# Patient Record
Sex: FEMALE | Race: WHITE | NOT HISPANIC OR LATINO | Employment: FULL TIME | URBAN - METROPOLITAN AREA
[De-identification: names, ages, dates, MRNs, and addresses within clinical notes are randomized per-mention and may not be internally consistent; named-entity substitution may affect disease eponyms.]

---

## 2018-07-05 ENCOUNTER — TELEPHONE (OUTPATIENT)
Dept: FAMILY MEDICINE | Facility: CLINIC | Age: 58
End: 2018-07-05

## 2018-07-05 NOTE — TELEPHONE ENCOUNTER
Pt out of town and unable to come in.  Wood sliver in R index finger tip x 2 weeks.  Red,swollen,tender with drainage.  Pt will soak in warm soapy water and massage to get it out.  Will f/u sooner where she is, if finger becomes swollen,red,fever.  Kpavlern

## 2018-07-05 NOTE — TELEPHONE ENCOUNTER
Reason for call:  Patient reporting a symptom    Symptom or request: Pt got a sliver in her finger on her left index finger a week ago and it's puffy and red.  Pt states that she is going out of town and cannot come into the clinic, due to going out of town and she wants to speak to an RN to see what she can do @ home.      Duration (how long have symptoms been present): 1 week    Have you been treated for this before? No    Additional comments:     Phone Number patient can be reached at:  Home number on file [phone] (home)    Best Time:  any    Can we leave a detailed message on this number:  YES    Call taken on 7/5/2018 at 8:36 AM by Ashlee Gross

## 2018-07-08 ENCOUNTER — HEALTH MAINTENANCE LETTER (OUTPATIENT)
Age: 58
End: 2018-07-08

## 2019-06-04 ENCOUNTER — ANESTHESIA EVENT (OUTPATIENT)
Dept: SURGERY | Facility: CLINIC | Age: 59
End: 2019-06-04
Payer: COMMERCIAL

## 2019-06-04 ENCOUNTER — APPOINTMENT (OUTPATIENT)
Dept: CT IMAGING | Facility: CLINIC | Age: 59
End: 2019-06-04
Attending: EMERGENCY MEDICINE
Payer: COMMERCIAL

## 2019-06-04 ENCOUNTER — HOSPITAL ENCOUNTER (OUTPATIENT)
Facility: CLINIC | Age: 59
Discharge: HOME | End: 2019-06-04
Attending: EMERGENCY MEDICINE | Admitting: SURGERY
Payer: COMMERCIAL

## 2019-06-04 ENCOUNTER — ANESTHESIA (OUTPATIENT)
Dept: SURGERY | Facility: CLINIC | Age: 59
End: 2019-06-04
Payer: COMMERCIAL

## 2019-06-04 VITALS
TEMPERATURE: 98.2 F | BODY MASS INDEX: 25.43 KG/M2 | OXYGEN SATURATION: 95 % | HEART RATE: 55 BPM | SYSTOLIC BLOOD PRESSURE: 104 MMHG | HEIGHT: 67 IN | WEIGHT: 162 LBS | RESPIRATION RATE: 16 BRPM | DIASTOLIC BLOOD PRESSURE: 66 MMHG

## 2019-06-04 DIAGNOSIS — K35.209 ACUTE APPENDICITIS WITH GENERALIZED PERITONITIS, WITHOUT GANGRENE OR ABSCESS, UNSPECIFIED WHETHER PERFORATION PRESENT: ICD-10-CM

## 2019-06-04 DIAGNOSIS — G89.18 POSTOPERATIVE PAIN: Primary | ICD-10-CM

## 2019-06-04 LAB
ALBUMIN SERPL-MCNC: 4 G/DL (ref 3.4–5)
ALBUMIN UR-MCNC: NEGATIVE MG/DL
ALP SERPL-CCNC: 68 U/L (ref 40–150)
ALT SERPL W P-5'-P-CCNC: 28 U/L (ref 0–50)
ANION GAP SERPL CALCULATED.3IONS-SCNC: 8 MMOL/L (ref 3–14)
APPEARANCE UR: CLEAR
AST SERPL W P-5'-P-CCNC: 24 U/L (ref 0–45)
BASOPHILS # BLD AUTO: 0 10E9/L (ref 0–0.2)
BASOPHILS NFR BLD AUTO: 0.4 %
BILIRUB SERPL-MCNC: 0.7 MG/DL (ref 0.2–1.3)
BILIRUB UR QL STRIP: NEGATIVE
BUN SERPL-MCNC: 13 MG/DL (ref 7–30)
CALCIUM SERPL-MCNC: 9.6 MG/DL (ref 8.5–10.1)
CHLORIDE SERPL-SCNC: 107 MMOL/L (ref 94–109)
CO2 SERPL-SCNC: 27 MMOL/L (ref 20–32)
COLOR UR AUTO: ABNORMAL
CREAT SERPL-MCNC: 0.81 MG/DL (ref 0.52–1.04)
DIFFERENTIAL METHOD BLD: NORMAL
EOSINOPHIL # BLD AUTO: 0.1 10E9/L (ref 0–0.7)
EOSINOPHIL NFR BLD AUTO: 0.8 %
ERYTHROCYTE [DISTWIDTH] IN BLOOD BY AUTOMATED COUNT: 12.2 % (ref 10–15)
GFR SERPL CREATININE-BSD FRML MDRD: 80 ML/MIN/{1.73_M2}
GLUCOSE SERPL-MCNC: 124 MG/DL (ref 70–99)
GLUCOSE UR STRIP-MCNC: NEGATIVE MG/DL
HCT VFR BLD AUTO: 44 % (ref 35–47)
HGB BLD-MCNC: 13.9 G/DL (ref 11.7–15.7)
HGB UR QL STRIP: NEGATIVE
IMM GRANULOCYTES # BLD: 0 10E9/L (ref 0–0.4)
IMM GRANULOCYTES NFR BLD: 0.3 %
KETONES UR STRIP-MCNC: NEGATIVE MG/DL
LACTATE BLD-SCNC: 3.1 MMOL/L (ref 0.7–2)
LEUKOCYTE ESTERASE UR QL STRIP: NEGATIVE
LYMPHOCYTES # BLD AUTO: 1.2 10E9/L (ref 0.8–5.3)
LYMPHOCYTES NFR BLD AUTO: 11.5 %
MCH RBC QN AUTO: 29.2 PG (ref 26.5–33)
MCHC RBC AUTO-ENTMCNC: 31.6 G/DL (ref 31.5–36.5)
MCV RBC AUTO: 92 FL (ref 78–100)
MONOCYTES # BLD AUTO: 0.6 10E9/L (ref 0–1.3)
MONOCYTES NFR BLD AUTO: 6.2 %
MUCOUS THREADS #/AREA URNS LPF: PRESENT /LPF
NEUTROPHILS # BLD AUTO: 8 10E9/L (ref 1.6–8.3)
NEUTROPHILS NFR BLD AUTO: 80.8 %
NITRATE UR QL: NEGATIVE
NRBC # BLD AUTO: 0 10*3/UL
NRBC BLD AUTO-RTO: 0 /100
PH UR STRIP: 9 PH (ref 5–7)
PLATELET # BLD AUTO: 166 10E9/L (ref 150–450)
POTASSIUM SERPL-SCNC: 3.6 MMOL/L (ref 3.4–5.3)
PROT SERPL-MCNC: 7.2 G/DL (ref 6.8–8.8)
RBC # BLD AUTO: 4.76 10E12/L (ref 3.8–5.2)
RBC #/AREA URNS AUTO: <1 /HPF (ref 0–2)
SODIUM SERPL-SCNC: 142 MMOL/L (ref 133–144)
SOURCE: ABNORMAL
SP GR UR STRIP: 1 (ref 1–1.03)
SQUAMOUS #/AREA URNS AUTO: <1 /HPF (ref 0–1)
UROBILINOGEN UR STRIP-MCNC: 0 MG/DL (ref 0–2)
WBC # BLD AUTO: 10 10E9/L (ref 4–11)
WBC #/AREA URNS AUTO: 1 /HPF (ref 0–5)

## 2019-06-04 PROCEDURE — 80053 COMPREHEN METABOLIC PANEL: CPT | Performed by: EMERGENCY MEDICINE

## 2019-06-04 PROCEDURE — 71000027 ZZH RECOVERY PHASE 2 EACH 15 MINS: Performed by: SURGERY

## 2019-06-04 PROCEDURE — 96361 HYDRATE IV INFUSION ADD-ON: CPT | Mod: 59 | Performed by: EMERGENCY MEDICINE

## 2019-06-04 PROCEDURE — 25000132 ZZH RX MED GY IP 250 OP 250 PS 637: Performed by: SURGERY

## 2019-06-04 PROCEDURE — 99285 EMERGENCY DEPT VISIT HI MDM: CPT | Mod: Z6 | Performed by: EMERGENCY MEDICINE

## 2019-06-04 PROCEDURE — 25000125 ZZHC RX 250: Performed by: NURSE ANESTHETIST, CERTIFIED REGISTERED

## 2019-06-04 PROCEDURE — 25000128 H RX IP 250 OP 636: Performed by: EMERGENCY MEDICINE

## 2019-06-04 PROCEDURE — 71000012 ZZH RECOVERY PHASE 1 LEVEL 1 FIRST HR: Performed by: SURGERY

## 2019-06-04 PROCEDURE — 25000128 H RX IP 250 OP 636: Performed by: NURSE ANESTHETIST, CERTIFIED REGISTERED

## 2019-06-04 PROCEDURE — 83605 ASSAY OF LACTIC ACID: CPT | Performed by: EMERGENCY MEDICINE

## 2019-06-04 PROCEDURE — 88304 TISSUE EXAM BY PATHOLOGIST: CPT | Mod: 26 | Performed by: SURGERY

## 2019-06-04 PROCEDURE — 25800030 ZZH RX IP 258 OP 636: Performed by: SURGERY

## 2019-06-04 PROCEDURE — 85025 COMPLETE CBC W/AUTO DIFF WBC: CPT | Performed by: EMERGENCY MEDICINE

## 2019-06-04 PROCEDURE — 37000009 ZZH ANESTHESIA TECHNICAL FEE, EACH ADDTL 15 MIN: Performed by: SURGERY

## 2019-06-04 PROCEDURE — 36000058 ZZH SURGERY LEVEL 3 EA 15 ADDTL MIN: Performed by: SURGERY

## 2019-06-04 PROCEDURE — 74177 CT ABD & PELVIS W/CONTRAST: CPT

## 2019-06-04 PROCEDURE — 25800030 ZZH RX IP 258 OP 636: Performed by: EMERGENCY MEDICINE

## 2019-06-04 PROCEDURE — 99285 EMERGENCY DEPT VISIT HI MDM: CPT | Mod: 25 | Performed by: EMERGENCY MEDICINE

## 2019-06-04 PROCEDURE — 37000008 ZZH ANESTHESIA TECHNICAL FEE, 1ST 30 MIN: Performed by: SURGERY

## 2019-06-04 PROCEDURE — 96365 THER/PROPH/DIAG IV INF INIT: CPT | Mod: 59 | Performed by: EMERGENCY MEDICINE

## 2019-06-04 PROCEDURE — 81001 URINALYSIS AUTO W/SCOPE: CPT | Performed by: EMERGENCY MEDICINE

## 2019-06-04 PROCEDURE — 25000125 ZZHC RX 250: Performed by: EMERGENCY MEDICINE

## 2019-06-04 PROCEDURE — 88304 TISSUE EXAM BY PATHOLOGIST: CPT | Performed by: SURGERY

## 2019-06-04 PROCEDURE — 96375 TX/PRO/DX INJ NEW DRUG ADDON: CPT | Mod: 59 | Performed by: EMERGENCY MEDICINE

## 2019-06-04 PROCEDURE — 36415 COLL VENOUS BLD VENIPUNCTURE: CPT | Performed by: EMERGENCY MEDICINE

## 2019-06-04 PROCEDURE — 25000125 ZZHC RX 250: Performed by: SURGERY

## 2019-06-04 PROCEDURE — 44970 LAPAROSCOPY APPENDECTOMY: CPT | Performed by: SURGERY

## 2019-06-04 PROCEDURE — 27110028 ZZH OR GENERAL SUPPLY NON-STERILE: Performed by: SURGERY

## 2019-06-04 PROCEDURE — 36000056 ZZH SURGERY LEVEL 3 1ST 30 MIN: Performed by: SURGERY

## 2019-06-04 PROCEDURE — 96366 THER/PROPH/DIAG IV INF ADDON: CPT | Performed by: EMERGENCY MEDICINE

## 2019-06-04 PROCEDURE — 44970 LAPAROSCOPY APPENDECTOMY: CPT | Mod: AS | Performed by: PHYSICIAN ASSISTANT

## 2019-06-04 PROCEDURE — 27210794 ZZH OR GENERAL SUPPLY STERILE: Performed by: SURGERY

## 2019-06-04 PROCEDURE — 40000306 ZZH STATISTIC PRE PROC ASSESS II: Performed by: SURGERY

## 2019-06-04 RX ORDER — KETOROLAC TROMETHAMINE 30 MG/ML
INJECTION, SOLUTION INTRAMUSCULAR; INTRAVENOUS PRN
Status: DISCONTINUED | OUTPATIENT
Start: 2019-06-04 | End: 2019-06-04

## 2019-06-04 RX ORDER — ONDANSETRON 2 MG/ML
4 INJECTION INTRAMUSCULAR; INTRAVENOUS EVERY 30 MIN PRN
Status: DISCONTINUED | OUTPATIENT
Start: 2019-06-04 | End: 2019-06-04 | Stop reason: HOSPADM

## 2019-06-04 RX ORDER — MEPERIDINE HYDROCHLORIDE 25 MG/ML
12.5 INJECTION INTRAMUSCULAR; INTRAVENOUS; SUBCUTANEOUS
Status: COMPLETED | OUTPATIENT
Start: 2019-06-04 | End: 2019-06-04

## 2019-06-04 RX ORDER — ACETAMINOPHEN 325 MG/1
975 TABLET ORAL ONCE
Status: DISCONTINUED | OUTPATIENT
Start: 2019-06-04 | End: 2019-06-04 | Stop reason: HOSPADM

## 2019-06-04 RX ORDER — BUPIVACAINE HYDROCHLORIDE AND EPINEPHRINE 5; 5 MG/ML; UG/ML
INJECTION, SOLUTION PERINEURAL PRN
Status: DISCONTINUED | OUTPATIENT
Start: 2019-06-04 | End: 2019-06-04 | Stop reason: HOSPADM

## 2019-06-04 RX ORDER — HYDROXYZINE HYDROCHLORIDE 50 MG/1
50 TABLET, FILM COATED ORAL EVERY 6 HOURS PRN
Status: DISCONTINUED | OUTPATIENT
Start: 2019-06-04 | End: 2019-06-04 | Stop reason: HOSPADM

## 2019-06-04 RX ORDER — HYDROXYZINE HYDROCHLORIDE 25 MG/1
25 TABLET, FILM COATED ORAL EVERY 6 HOURS PRN
Status: DISCONTINUED | OUTPATIENT
Start: 2019-06-04 | End: 2019-06-04 | Stop reason: HOSPADM

## 2019-06-04 RX ORDER — CEFOXITIN SODIUM 2 G/50ML
2 INJECTION, SOLUTION INTRAVENOUS 4 TIMES DAILY
Status: DISCONTINUED | OUTPATIENT
Start: 2019-06-04 | End: 2019-06-04 | Stop reason: HOSPADM

## 2019-06-04 RX ORDER — FENTANYL CITRATE 50 UG/ML
INJECTION, SOLUTION INTRAMUSCULAR; INTRAVENOUS PRN
Status: DISCONTINUED | OUTPATIENT
Start: 2019-06-04 | End: 2019-06-04

## 2019-06-04 RX ORDER — ONDANSETRON 4 MG/1
4 TABLET, ORALLY DISINTEGRATING ORAL EVERY 30 MIN PRN
Status: DISCONTINUED | OUTPATIENT
Start: 2019-06-04 | End: 2019-06-04 | Stop reason: HOSPADM

## 2019-06-04 RX ORDER — HYDROCODONE BITARTRATE AND ACETAMINOPHEN 5; 325 MG/1; MG/1
1-2 TABLET ORAL EVERY 6 HOURS PRN
Qty: 30 TABLET | Refills: 0 | Status: SHIPPED | OUTPATIENT
Start: 2019-06-04 | End: 2023-07-13

## 2019-06-04 RX ORDER — IOPAMIDOL 755 MG/ML
79 INJECTION, SOLUTION INTRAVASCULAR ONCE
Status: COMPLETED | OUTPATIENT
Start: 2019-06-04 | End: 2019-06-04

## 2019-06-04 RX ORDER — PROPOFOL 10 MG/ML
INJECTION, EMULSION INTRAVENOUS PRN
Status: DISCONTINUED | OUTPATIENT
Start: 2019-06-04 | End: 2019-06-04

## 2019-06-04 RX ORDER — LIDOCAINE HYDROCHLORIDE 10 MG/ML
INJECTION, SOLUTION INFILTRATION; PERINEURAL PRN
Status: DISCONTINUED | OUTPATIENT
Start: 2019-06-04 | End: 2019-06-04

## 2019-06-04 RX ORDER — SODIUM CHLORIDE, SODIUM LACTATE, POTASSIUM CHLORIDE, CALCIUM CHLORIDE 600; 310; 30; 20 MG/100ML; MG/100ML; MG/100ML; MG/100ML
INJECTION, SOLUTION INTRAVENOUS CONTINUOUS
Status: DISCONTINUED | OUTPATIENT
Start: 2019-06-04 | End: 2019-06-04 | Stop reason: HOSPADM

## 2019-06-04 RX ORDER — NALOXONE HYDROCHLORIDE 0.4 MG/ML
.1-.4 INJECTION, SOLUTION INTRAMUSCULAR; INTRAVENOUS; SUBCUTANEOUS
Status: DISCONTINUED | OUTPATIENT
Start: 2019-06-04 | End: 2019-06-04 | Stop reason: HOSPADM

## 2019-06-04 RX ORDER — NEOSTIGMINE METHYLSULFATE 1 MG/ML
VIAL (ML) INJECTION PRN
Status: DISCONTINUED | OUTPATIENT
Start: 2019-06-04 | End: 2019-06-04

## 2019-06-04 RX ORDER — HYDROCODONE BITARTRATE AND ACETAMINOPHEN 5; 325 MG/1; MG/1
1-2 TABLET ORAL EVERY 4 HOURS PRN
Status: DISCONTINUED | OUTPATIENT
Start: 2019-06-04 | End: 2019-06-04 | Stop reason: HOSPADM

## 2019-06-04 RX ORDER — HYDROMORPHONE HYDROCHLORIDE 1 MG/ML
.3-.5 INJECTION, SOLUTION INTRAMUSCULAR; INTRAVENOUS; SUBCUTANEOUS
Status: CANCELLED | OUTPATIENT
Start: 2019-06-04

## 2019-06-04 RX ORDER — FENTANYL CITRATE 50 UG/ML
25-50 INJECTION, SOLUTION INTRAMUSCULAR; INTRAVENOUS
Status: CANCELLED | OUTPATIENT
Start: 2019-06-04

## 2019-06-04 RX ORDER — FENTANYL CITRATE 50 UG/ML
25-50 INJECTION, SOLUTION INTRAMUSCULAR; INTRAVENOUS
Status: DISCONTINUED | OUTPATIENT
Start: 2019-06-04 | End: 2019-06-04 | Stop reason: HOSPADM

## 2019-06-04 RX ORDER — ACETAMINOPHEN 325 MG/1
975 TABLET ORAL ONCE
Status: COMPLETED | OUTPATIENT
Start: 2019-06-04 | End: 2019-06-04

## 2019-06-04 RX ORDER — GLYCOPYRROLATE 0.2 MG/ML
INJECTION, SOLUTION INTRAMUSCULAR; INTRAVENOUS PRN
Status: DISCONTINUED | OUTPATIENT
Start: 2019-06-04 | End: 2019-06-04

## 2019-06-04 RX ORDER — NALOXONE HYDROCHLORIDE 0.4 MG/ML
.1-.4 INJECTION, SOLUTION INTRAMUSCULAR; INTRAVENOUS; SUBCUTANEOUS
Status: CANCELLED | OUTPATIENT
Start: 2019-06-04

## 2019-06-04 RX ORDER — DEXAMETHASONE SODIUM PHOSPHATE 4 MG/ML
4 INJECTION, SOLUTION INTRA-ARTICULAR; INTRALESIONAL; INTRAMUSCULAR; INTRAVENOUS; SOFT TISSUE EVERY 10 MIN PRN
Status: DISCONTINUED | OUTPATIENT
Start: 2019-06-04 | End: 2019-06-04 | Stop reason: HOSPADM

## 2019-06-04 RX ORDER — DEXAMETHASONE SODIUM PHOSPHATE 4 MG/ML
INJECTION, SOLUTION INTRA-ARTICULAR; INTRALESIONAL; INTRAMUSCULAR; INTRAVENOUS; SOFT TISSUE PRN
Status: DISCONTINUED | OUTPATIENT
Start: 2019-06-04 | End: 2019-06-04

## 2019-06-04 RX ORDER — MORPHINE SULFATE 2 MG/ML
2-4 INJECTION, SOLUTION INTRAMUSCULAR; INTRAVENOUS EVERY 30 MIN PRN
Status: DISCONTINUED | OUTPATIENT
Start: 2019-06-04 | End: 2019-06-04 | Stop reason: HOSPADM

## 2019-06-04 RX ORDER — ONDANSETRON 2 MG/ML
INJECTION INTRAMUSCULAR; INTRAVENOUS PRN
Status: DISCONTINUED | OUTPATIENT
Start: 2019-06-04 | End: 2019-06-04

## 2019-06-04 RX ADMIN — SODIUM CHLORIDE 60 ML: 9 INJECTION, SOLUTION INTRAVENOUS at 05:28

## 2019-06-04 RX ADMIN — MIDAZOLAM 2 MG: 1 INJECTION INTRAMUSCULAR; INTRAVENOUS at 13:34

## 2019-06-04 RX ADMIN — MEPERIDINE HYDROCHLORIDE 12.5 MG: 25 INJECTION INTRAMUSCULAR; INTRAVENOUS; SUBCUTANEOUS at 15:00

## 2019-06-04 RX ADMIN — HYDROCODONE BITARTRATE AND ACETAMINOPHEN 1 TABLET: 5; 325 TABLET ORAL at 16:40

## 2019-06-04 RX ADMIN — MORPHINE SULFATE 4 MG: 2 INJECTION, SOLUTION INTRAMUSCULAR; INTRAVENOUS at 05:15

## 2019-06-04 RX ADMIN — MEPERIDINE HYDROCHLORIDE 12.5 MG: 25 INJECTION INTRAMUSCULAR; INTRAVENOUS; SUBCUTANEOUS at 15:10

## 2019-06-04 RX ADMIN — LIDOCAINE HYDROCHLORIDE 50 MG: 10 INJECTION, SOLUTION INFILTRATION; PERINEURAL at 13:43

## 2019-06-04 RX ADMIN — GLYCOPYRROLATE 0.2 MG: 0.2 INJECTION, SOLUTION INTRAMUSCULAR; INTRAVENOUS at 13:43

## 2019-06-04 RX ADMIN — SUGAMMADEX 200 MG: 100 INJECTION, SOLUTION INTRAVENOUS at 14:12

## 2019-06-04 RX ADMIN — ACETAMINOPHEN 975 MG: 325 TABLET, FILM COATED ORAL at 07:58

## 2019-06-04 RX ADMIN — SODIUM CHLORIDE, POTASSIUM CHLORIDE, SODIUM LACTATE AND CALCIUM CHLORIDE: 600; 310; 30; 20 INJECTION, SOLUTION INTRAVENOUS at 11:47

## 2019-06-04 RX ADMIN — PROPOFOL 150 MG: 10 INJECTION, EMULSION INTRAVENOUS at 13:43

## 2019-06-04 RX ADMIN — SODIUM CHLORIDE, POTASSIUM CHLORIDE, SODIUM LACTATE AND CALCIUM CHLORIDE: 600; 310; 30; 20 INJECTION, SOLUTION INTRAVENOUS at 08:46

## 2019-06-04 RX ADMIN — ONDANSETRON 4 MG: 2 INJECTION INTRAMUSCULAR; INTRAVENOUS at 14:06

## 2019-06-04 RX ADMIN — CEFOXITIN SODIUM 2 G: 2 INJECTION, SOLUTION INTRAVENOUS at 14:49

## 2019-06-04 RX ADMIN — KETOROLAC TROMETHAMINE 30 MG: 30 INJECTION, SOLUTION INTRAMUSCULAR at 14:06

## 2019-06-04 RX ADMIN — ROCURONIUM BROMIDE 35 MG: 10 INJECTION INTRAVENOUS at 13:43

## 2019-06-04 RX ADMIN — ONDANSETRON 4 MG: 2 INJECTION INTRAMUSCULAR; INTRAVENOUS at 13:43

## 2019-06-04 RX ADMIN — SODIUM CHLORIDE, POTASSIUM CHLORIDE, SODIUM LACTATE AND CALCIUM CHLORIDE 1000 ML: 600; 310; 30; 20 INJECTION, SOLUTION INTRAVENOUS at 05:14

## 2019-06-04 RX ADMIN — FENTANYL CITRATE 150 MCG: 50 INJECTION, SOLUTION INTRAMUSCULAR; INTRAVENOUS at 13:43

## 2019-06-04 RX ADMIN — GLYCOPYRROLATE 0.6 MG: 0.2 INJECTION, SOLUTION INTRAMUSCULAR; INTRAVENOUS at 14:06

## 2019-06-04 RX ADMIN — DEXAMETHASONE SODIUM PHOSPHATE 8 MG: 4 INJECTION, SOLUTION INTRA-ARTICULAR; INTRALESIONAL; INTRAMUSCULAR; INTRAVENOUS; SOFT TISSUE at 13:43

## 2019-06-04 RX ADMIN — ONDANSETRON 4 MG: 2 INJECTION INTRAMUSCULAR; INTRAVENOUS at 05:15

## 2019-06-04 RX ADMIN — Medication 4 MG: at 14:06

## 2019-06-04 RX ADMIN — CEFOXITIN SODIUM 2 G: 1 POWDER, FOR SOLUTION INTRAVENOUS at 06:52

## 2019-06-04 RX ADMIN — IOPAMIDOL 79 ML: 755 INJECTION, SOLUTION INTRAVENOUS at 05:28

## 2019-06-04 ASSESSMENT — ENCOUNTER SYMPTOMS
FATIGUE: 0
VOMITING: 0
CHEST TIGHTNESS: 0
BACK PAIN: 0
LIGHT-HEADEDNESS: 0
APPETITE CHANGE: 0
FLANK PAIN: 0
HEADACHES: 0
COUGH: 0
SHORTNESS OF BREATH: 0
FEVER: 0
ABDOMINAL DISTENTION: 1
DIARRHEA: 0
ABDOMINAL PAIN: 1
NAUSEA: 1
DYSURIA: 0
CHILLS: 0
DIAPHORESIS: 0
CONSTIPATION: 0

## 2019-06-04 ASSESSMENT — MIFFLIN-ST. JEOR: SCORE: 1352.46

## 2019-06-04 NOTE — ANESTHESIA CARE TRANSFER NOTE
Patient: Adrianne Phan    Procedure(s):  APPENDECTOMY, LAPAROSCOPIC    Diagnosis: appendicitis  Diagnosis Additional Information: No value filed.    Anesthesia Type:   General, ETT     Note:  Airway :Face Mask  Patient transferred to:PACU  Handoff Report: Identifed the Patient, Identified the Reponsible Provider, Reviewed the pertinent medical history, Discussed the surgical course, Reviewed Intra-OP anesthesia mangement and issues during anesthesia, Set expectations for post-procedure period and Allowed opportunity for questions and acknowledgement of understanding      Vitals: (Last set prior to Anesthesia Care Transfer)    CRNA VITALS  6/4/2019 1400 - 6/4/2019 1431      6/4/2019             Pulse:  90    SpO2:  96 %    Resp Rate (observed):  12                Electronically Signed By: REE Godoy CRNA  June 4, 2019  2:31 PM

## 2019-06-04 NOTE — ED PROVIDER NOTES
History     Chief Complaint   Patient presents with     Abdominal Pain     dry heaving, since 2200 no real vomit no fever no diarrhea     HPI  Adrianne Phan is a 58 year old female with no significant contributing past medical history presenting for evaluation of relatively abrupt onset progressive left lower quadrant abdominal pain.  Patient reports symptoms began around 10 PM and have progressively worsened.  Pain associated with 2 loose nonbloody bowel movements.  Denies fever chills.  Denies chest pain or difficulty breathing.  Pain is been associated with nausea and dry heaving but no vomiting.  Denies previous similar symptoms in the past.  Had a previous colonoscopy approximately 8 or 10 years ago which was normal.  No history of diverticulitis.  No history of kidney stones.  Did have a previous tubal ligation but no history of bowel obstruction.  Pain is localized to left lower quadrant without radiation or migration.  Pain is sharp and intense.  No medications taken prior to ED arrival    Allergies:  Allergies   Allergen Reactions     Nkda [No Known Drug Allergies]        Problem List:    Patient Active Problem List    Diagnosis Date Noted     CARDIOVASCULAR SCREENING; LDL GOAL LESS THAN 160 10/31/2010     Priority: Medium     Female stress incontinence 12/11/2008     Priority: Medium     (Problem list name updated by automated process. Provider to review and confirm.)       Papanicolaou smear of cervix with atypical squamous cells of undetermined significance (ASC-US) 04/13/2006     Priority: Medium     8/18/05: Pap - ASCUS, Neg HPV  1/26/06: Pap - ASCUS, Neg HPV  10/06, 07, 08, 11: Paps - Normal.   5/7/15: Pap - ASCUS, Neg HPV. Plan cotest in 3 years.           Past Medical History:    Past Medical History:   Diagnosis Date     ASCUS favor benign 5/2015     PAP SMEAR OF CERVIX W ASCUS 4/13/2006     PONV (postoperative nausea and vomiting)        Past Surgical History:    Past Surgical History:  "  Procedure Laterality Date     COLONOSCOPY  10/26/2012    Procedure: COLONOSCOPY;  colonoscopy;  Surgeon: Nai Bonilla MD;  Location: [facility]     SURGICAL HISTORY OF -   [id number]    bilateral tubal ligation       Family History:    Family History   Problem Relation Age of Onset     Thyroid Disease Mother      Hypertension Mother      Musculoskeletal Disorder Mother      Hypertension Father      Musculoskeletal Disorder Sister      Eye Disorder Brother      Neurologic Disorder Brother      Musculoskeletal Disorder Brother      Family History Negative No family hx of      Colon Cancer Maternal Aunt 70       Social History:  Marital Status:   [2]  Social History     Tobacco Use     Smoking status: Never Smoker   Substance Use Topics     Alcohol use: No     Drug use: No        Medications:      NO ACTIVE MEDICATIONS         Review of Systems   Constitutional: Negative for appetite change (ate normally through the day yesterday), chills, diaphoresis, fatigue and fever.   HENT: Negative for congestion.    Respiratory: Negative for cough, chest tightness and shortness of breath.    Cardiovascular: Negative for chest pain.   Gastrointestinal: Positive for abdominal distention, abdominal pain and nausea. Negative for constipation, diarrhea (loose stool this am) and vomiting (dry heaves only).   Genitourinary: Negative for decreased urine volume, dysuria, flank pain, urgency and vaginal bleeding.   Musculoskeletal: Negative for back pain.   Skin: Negative for rash.   Neurological: Negative for light-headedness and headaches.   All other systems reviewed and are negative.      Physical Exam   BP: 126/82  Heart Rate: 69  Temp: 97.9  F (36.6  C)  Resp: 20  Height: 170.2 cm (5' 7\")  Weight: 73.5 kg (162 lb)  SpO2: 100 %      Physical Exam   Constitutional: She is oriented to person, place, and time. She appears well-developed and well-nourished.   Appears uncomfortable, holding her LL abdomen   HENT:   Head: " Normocephalic and atraumatic.   Eyes: Conjunctivae are normal.   Cardiovascular: Normal rate.   Pulmonary/Chest: Effort normal.   Abdominal: Soft. She exhibits distension. She exhibits no mass. There is tenderness (LLQ). There is no rebound and no guarding.   Musculoskeletal: Normal range of motion.   Neurological: She is alert and oriented to person, place, and time.   Skin: Skin is warm and dry. Capillary refill takes less than 2 seconds.   Psychiatric: She has a normal mood and affect.   Nursing note and vitals reviewed.      ED Course        Procedures               Results for orders placed or performed during the hospital encounter of 06/04/19 (from the past 24 hour(s))   CBC with platelets differential   Result Value Ref Range    WBC 10.0 4.0 - 11.0 10e9/L    RBC Count 4.76 3.8 - 5.2 10e12/L    Hemoglobin 13.9 11.7 - 15.7 g/dL    Hematocrit 44.0 35.0 - 47.0 %    MCV 92 78 - 100 fl    MCH 29.2 26.5 - 33.0 pg    MCHC 31.6 31.5 - 36.5 g/dL    RDW 12.2 10.0 - 15.0 %    Platelet Count 166 150 - 450 10e9/L    Diff Method Automated Method     % Neutrophils 80.8 %    % Lymphocytes 11.5 %    % Monocytes 6.2 %    % Eosinophils 0.8 %    % Basophils 0.4 %    % Immature Granulocytes 0.3 %    Nucleated RBCs 0 0 /100    Absolute Neutrophil 8.0 1.6 - 8.3 10e9/L    Absolute Lymphocytes 1.2 0.8 - 5.3 10e9/L    Absolute Monocytes 0.6 0.0 - 1.3 10e9/L    Absolute Eosinophils 0.1 0.0 - 0.7 10e9/L    Absolute Basophils 0.0 0.0 - 0.2 10e9/L    Abs Immature Granulocytes 0.0 0 - 0.4 10e9/L    Absolute Nucleated RBC 0.0    Comprehensive metabolic panel   Result Value Ref Range    Sodium 142 133 - 144 mmol/L    Potassium 3.6 3.4 - 5.3 mmol/L    Chloride 107 94 - 109 mmol/L    Carbon Dioxide 27 20 - 32 mmol/L    Anion Gap 8 3 - 14 mmol/L    Glucose 124 (H) 70 - 99 mg/dL    Urea Nitrogen 13 7 - 30 mg/dL    Creatinine 0.81 0.52 - 1.04 mg/dL    GFR Estimate 80 >60 mL/min/[1.73_m2]    GFR Estimate If Black >90 >60 mL/min/[1.73_m2]     Calcium 9.6 8.5 - 10.1 mg/dL    Bilirubin Total 0.7 0.2 - 1.3 mg/dL    Albumin 4.0 3.4 - 5.0 g/dL    Protein Total 7.2 6.8 - 8.8 g/dL    Alkaline Phosphatase 68 40 - 150 U/L    ALT 28 0 - 50 U/L    AST 24 0 - 45 U/L   Lactic acid whole blood   Result Value Ref Range    Lactic Acid 3.1 (H) 0.7 - 2.0 mmol/L   CT Abdomen Pelvis w Contrast    Narrative    CT ABDOMEN PELVIS W CONTRAST  6/4/2019 5:38 AM     HISTORY: Left lower quadrant pain, cramping.    TECHNIQUE: Volumetric acquisition through abdomen and pelvis with IV  contrast. 79 mL Isovue-370. Radiation dose for this scan was reduced  using automated exposure control, adjustment of the mA and/or kV  according to patient size, or iterative reconstruction technique.    COMPARISON: None.    FINDINGS: 2.5 cm lesion in the left hepatic lobe demonstrates some  peripheral enhancement. This may be a hemangioma but is indeterminant  on this study. No other liver lesions. Gallbladder, spleen, pancreas,  adrenal glands and kidneys demonstrate no worrisome findings. Low  attenuation subcentimeter renal lesion(s). These are compatible with  small benign cysts and no specific imaging evaluation or follow-up is  recommended. No hydronephrosis.    Uterus is present. No suspicious pelvic masses. Appendix is prominent  with a slightly thickened, enhancing wall and some endoluminal fluid.  The appendix measures up to 1.5 cm in diameter and there is minimal  adjacent fat stranding. No other convincing bowel abnormality. The  left colon is decompressed making accurate assessment of bowel wall  thickness difficult, but there is no convincing bowel wall thickening.      Impression    IMPRESSION:  1. Findings suspicious for appendicitis.  2. Indeterminant liver lesion measuring 2.5 cm. Recommend follow-up  outpatient MRI.    GABY DUMONT MD       Medications   ondansetron (ZOFRAN) injection 4 mg (4 mg Intravenous Given 6/4/19 0515)   morphine (PF) injection 2-4 mg (4 mg Intravenous  Given 6/4/19 0515)   cefOXitin (MEFOXIN) 2 g in sodium chloride 0.9 % 100 mL intermittent infusion (has no administration in time range)   lactated ringers BOLUS 1,000 mL (1,000 mLs Intravenous New Bag 6/4/19 0514)   iopamidol (ISOVUE-370) solution 79 mL (79 mLs Intravenous Given 6/4/19 0528)   sodium chloride 0.9 % bag 500mL for CT scan flush use (60 mLs Intravenous Given 6/4/19 0528)     6:08 AM Patient re-assessed: Pain improved with meds.  Minimal discomfort but still with mild diffuse tenderness in her lower abdomen on palpation.  Not distantly more tender over the appendix.  Discussed CT and lab findings with the patient.  Paging on-call surgeon to discuss further care.    6:28 AM: Still no answer from on call surgeon, Dr Plaza. Paging 7am surgeon on call, Dr Fu.     6:30 AM: Discussed with Dr Fu. He personally reviewed the CT images. States CT consistent with appendicitis.  Requests Mefoxin and admit to surgery service    Assessments & Plan (with Medical Decision Making)  58-year-old female presented for evaluation of progressive lower abdominal pain since around 10 PM last night.  Patient reports symptoms occurred relatively abruptly and have progressively worsened throughout the night.  Patient reports associated loose stools and some nausea with dry heaving.  Denies fever chills.  No injury.  No blood in the stool.  Denies any dysuria, urgency, urgency.  Uncomfortable in the ED with diffuse lower pelvic abdominal tenderness.  Blood work and CT obtained.  Blood work showed a increased lactic acid of 3.1 with no white count or left shift.  CT imaging showed no evidence of diverticulitis or colitis but did show some thickening enlargement with stranding around the appendix suspicious for acute appendicitis.  Case reviewed with on-call surgeon Dr Fu.  Accepts for admission with plan for probable surgery     I have reviewed the nursing notes.    I have reviewed the findings, diagnosis, plan  and need for follow up with the patient.          Medication List      There are no discharge medications for this visit.         Final diagnoses:   Acute appendicitis with generalized peritonitis, without gangrene or abscess, unspecified whether perforation present       6/4/2019   [facility] EMERGENCY DEPARTMENT     Cole, Kamlesh Gibson MD  06/04/19 1564

## 2019-06-04 NOTE — ANESTHESIA POSTPROCEDURE EVALUATION
Patient: Adrianne Phan    Procedure(s):  APPENDECTOMY, LAPAROSCOPIC    Diagnosis:appendicitis  Diagnosis Additional Information: No value filed.    Anesthesia Type:  General, ETT    Note:  Anesthesia Post Evaluation    Patient location during evaluation: Phase 2  Patient participation: Able to fully participate in evaluation  Level of consciousness: awake and alert  Pain management: adequate  Airway patency: patent  Cardiovascular status: acceptable and hemodynamically stable  Respiratory status: acceptable, room air and spontaneous ventilation  Hydration status: acceptable  PONV: none     Anesthetic complications: None          Last vitals:  Vitals:    06/04/19 1545 06/04/19 1600 06/04/19 1615   BP: 101/65 109/65 102/65   Pulse: 68 61 55   Resp: 14 15 14   Temp:      SpO2: 96% 95% 95%         Electronically Signed By: REE Dougherty CRNA  June 4, 2019  4:34 PM

## 2019-06-04 NOTE — OR NURSING
Dr king in to see pt preop. Pt npo at 0830. Iv LR tko. Pt calls  . Pt remains comfortable.Will cont to reassess.

## 2019-06-04 NOTE — OR NURSING
To sds per wheelchair. ruba activity well. No pain or nausea at present. On phone.  takes pt wedding ring. Sits in bed and up in room. Iv hooked up with LR tko. Sips on apple juice until 1000/ ruba well. Tylenol po given preop.  going home then will return. Doing well.

## 2019-06-04 NOTE — OP NOTE
Preop diagnosis acute appendicitis    Postop diagnosis: Same    Procedure: Laparoscopic appendectomy    Surgeon: Johnnie    Assistant surgeon: Rizwan Mcdonald PA-C (needed for expertise and camera operation retraction hemostasis and suctioning)    Anesthesia: General endotracheal Rosas CRNA    Procedure: Patient's abdomen was cleaned and draped in a sterile manner.  2 g of cefoxitin were given as perioperative antibiotics.  1/4% Marcaine with epinephrine was used to anesthetize all port sites.  Small subumbilical curvilinear incision made and subcutaneous tissues dissected to fascia.  Fascia opened sharply and 12 mm blunt trocar inserted.  Carbon dioxide insufflated to 15 mmHg.  Under direct vision, suprapubic 5 mm trocar placed as well as left lower quadrant 5 mm trocar.  Patient placed into Trendelenburg position left side down.  Appendix was easily located in the right upper quadrant.  It was distended and inflamed at the tip.  It was not perforated.  The appendix was gently grasped and elevated.  The mesoappendix was taken down using LigaSure device all the way to clean base.  An 0 Vicryl Endoloop was then cinched around the base snugly and the appendix amputated using the LigaSure device.  It was placed into an Endo Catch bag and brought out through the subumbilical port.  2 L of warm normal saline solution was used to irrigate out the abdominal cavity and this was sucked free until the effluent was clear.  Special care was taken to irrigate and around the pelvis the appendiceal stump and above the liver.  A final inspection of the appendiceal stump revealed it to be intact with no evidence of hemorrhage or leakage.  All trochars were removed under direct vision and the air allowed to desufflate.  The fascial defect of the subumbilical port was closed using an 0 Vicryl suture in a figure-of-eight fashion and this was bolstered with a second 0 Vicryl on top of that and reinjected with Marcaine.  All wounds were  irrigated with normal saline and the skin closed using 4-0 Vicryl running subcuticular stitches and dressed with Dermabond.    Estimated blood loss: 5 mL's    IV fluid: 700 mL's

## 2019-06-04 NOTE — DISCHARGE INSTRUCTIONS
DISCHARGE INSTRUCTIONS     1. You may resume your regular diet when you feel you are ready    2. No heavy lifting (>30lbs)  for 1 month.    3. You will have some discomfort at the incision sites. This is expected. This should improve over the next 2-3 days. Ice and pain medication will help with this pain. Use prescribed pain medication as instructed.     4. Some bruising and mild swelling is normal after surgery. The area below and around the incision(s) may be hard and elevated. This is part of the normal healing process. This will resolve slowly over the next several months.     5. Your wounds are covered with glue. The glue is water tight and so you can shower or bathe immediately following surgery.     6. Use the following medications (in addition to your normal meds) as shown:      Tylenol (acetaminophen) 500 mg every 6 hours as needed for pain.    Do not take more than 1000 mg of Tylenol every 6 hours -OR- 4g in a day    Motrin (ibuprophen) 600 mg every 6 hours as needed for pain. Take with food.     8. Notify Clinic at [phone] if:     Your discomfort is not relieved by your pain medication     You have signs of infection such as temperature above 100.4 degrees orally,  chills, or increasing daily discomfort.     Incision site is becoming more red and/or there is purulent drainage.      9. Follow up with Dr Blair in 1-2 weeks.    10. When taking narcotics it is important to keep your stools soft to avoid constipation and pain with straining. This is best done by drinking and taking a stool softener (Metamucil or milk of magnesia).      11. Most people take the rest of the week off and return to work the following Monday. You may return sooner as pain allows. During your follow-up appointment, the doctor will give you a formal letter for your work with any restrictions detailed.  All disability or other such paperwork will be addressed at that time.                        Same Day Surgery Discharge  Instructions  Special Precautions After Surgery - Adult    1. It is not unusual to feel lightheaded or faint, up to 24 hours after surgery or while taking pain medication.  If you have these symptoms; sit for a few minutes before standing and have someone assist you when getting up.  2. You should rest and relax for the next 24 hours and must have someone stay with you for at least 24 hours after your discharge.  3. DO NOT DRIVE any vehicle or operate mechanical equipment for 24 hours following the end of your surgery.  DO NOT DRIVE while taking narcotic pain medications that have been prescribed by your physician.  If you had a limb operated on, you must be able to use it fully to drive.  4. DO NOT drink alcoholic beverages for 24 hours following surgery or while taking prescription pain medication.  5. Drink clear liquids (apple juice, ginger ale, broth, 7-Up, etc.).  Progress to your regular diet as you feel able.  6. Any questions call your physician and do not make important decisions for 24 hours.    ACTIVITY  ? Rest today.  No activity or diet restrictions.  ? Resume activity as tolerated.  ? Restrictions: per Dr Blair  ? See printed discharge sheet.     INCISIONAL CARE  ? Apply ice 1/2 hour on and 1/2 hour off while awake as needed for pain.  ? Be alert for signs of infection:  redness, swelling, heat, drainage of pus, and/or elevated temperature.  Contact your doctor if these occur.        Call for an appointment to return to the clinic as directed    Medications:  ? Acetaminophen (Tylenol):  Next dose: as needed. (DO NOT TAKE WHILE TAKING THE HYDROCODONE.)  ? Hydrocodone (Norco, Vicodin):  Next dose: 10:30 pm .  ? Ibuprofen (Motrin, Advil):  Next dose: as needed.  ? Follow the instructions on the bottle's.    __________________________________________________________________________________________________________________________________  IMPORTANT NUMBERS:    [facility] Main Number:  [phone],  [phone]  Pharmacy:  [phone]  Same Day Surgery:  [phone], Monday - Friday until 8:30 p.m.  Urgent Care:  [phone]  Emergency Room:  [phone]      Sur[facility] Specialty [facility]:  [phone]

## 2019-06-04 NOTE — H&P
"[facility] - H&P     Adrianne Phan MRN# [id number]   Age: 58 year old YOB: 1960     HPI:  Patient has been experiencing acute suprapubic abdominal pain for the past 10 hours associated with anorexia, nausea, retching.  These symptoms have been increasing in severity. She has never had these symptoms prior.  She admits subjective fevers and chills.  No other sick contacts at home.    Denies dysuria, hematuria, blood in stool.  Last colonoscopy was 7 years prior, negative.  No family history of Crohn's or UC.    History is obtained from the patient    Review Of Systems:  Respiratory: No shortness of breath, dyspnea on exertion, cough, or hemoptysis  Cardiovascular: negative  Gastrointestinal: as above  Genitourinary: negative    PMH:  Past Medical History:   Diagnosis Date     ASCUS favor benign 5/2015    Neg HPV - Plan cotest in 3 years     PAP SMEAR OF CERVIX W ASCUS 4/13/2006 4/13/2006 Ascus x 2.  Hpv- x 2.      PONV (postoperative nausea and vomiting)        PSH:  Past Surgical History:   Procedure Laterality Date     COLONOSCOPY  10/26/2012    Procedure: COLONOSCOPY;  colonoscopy;  Surgeon: Nai Bonilla MD;  Location: [facility]     SURGICAL HISTORY OF -   [id number]    bilateral tubal ligation       Allergies:  Allergies   Allergen Reactions     Nkda [No Known Drug Allergies]        Home Medications:  Current Outpatient Medications   Medication Sig Dispense Refill     NO ACTIVE MEDICATIONS .         Social History:  Social History     Tobacco Use     Smoking status: Never Smoker   Substance Use Topics     Alcohol use: No     Drug use: No       Family History:  No family history chronic diarrhea, inflammatory bowel disease or colon cancer.    Objective:  /82   Temp 97.9  F (36.6  C) (Oral)   Resp 20   Ht 1.702 m (5' 7\")   Wt 73.5 kg (162 lb)   SpO2 100%   BMI 25.37 kg/m      General appearance: healthy, alert and minimal " distress  Hydration: well hydrated  Neck: normal, supple and no adenopathy  Lungs: normal and clear to auscultation  Heart: regular rate and rhythm and no murmurs, clicks, or gallops  Abdomen: flat, normal bowel sounds and hypoactive bowel sounds.   Tenderness: present: suprapubic minimal, no R/R/G.  Negative Rovsing's sign  Masses: none  Organomegaly: no hepatosplenomegaly    Labs Reviewed:  Lab Results   Component Value Date    WBC 10.0 06/04/2019     Lab Results   Component Value Date    HGB 13.9 06/04/2019     Lab Results   Component Value Date     06/04/2019       Last Basic Metabolic Panel:  Lab Results   Component Value Date     06/04/2019      Lab Results   Component Value Date    POTASSIUM 3.6 06/04/2019     Lab Results   Component Value Date    CHLORIDE 107 06/04/2019     Lab Results   Component Value Date    KASSIDY 9.6 06/04/2019     Lab Results   Component Value Date    CO2 27 06/04/2019     Lab Results   Component Value Date    BUN 13 06/04/2019     Lab Results   Component Value Date    CR 0.81 06/04/2019     Lab Results   Component Value Date     06/04/2019     Radiology:  FINDINGS: 2.5 cm lesion in the left hepatic lobe demonstrates some  peripheral enhancement. This may be a hemangioma but is indeterminant  on this study. No other liver lesions. Gallbladder, spleen, pancreas,  adrenal glands and kidneys demonstrate no worrisome findings. Low  attenuation subcentimeter renal lesion(s). These are compatible with  small benign cysts and no specific imaging evaluation or follow-up is  recommended. No hydronephrosis.     Uterus is present. No suspicious pelvic masses. Appendix is prominent  with a slightly thickened, enhancing wall and some endoluminal fluid.  The appendix measures up to 1.5 cm in diameter and there is minimal  adjacent fat stranding. No other convincing bowel abnormality. The  left colon is decompressed making accurate assessment of bowel wall  thickness difficult, but  there is no convincing bowel wall thickening.                                                                      IMPRESSION:  1. Findings suspicious for appendicitis.  2. Indeterminant liver lesion measuring 2.5 cm. Recommend follow-up  outpatient MRI.     GABY DUMONT MD    ASSESSMENT/PLAN:  The patient's history, physical exam, laboratory and imaging studies are suspicious for acute appendicitis.  I have offered the patient laparoscopic appendectomy.  The risks, benefits, and alternatives have been discussed in detail.  All of the patient's questions have been answered.  They elect to proceed and we will go to the OR at the soonest availability which may be with my partner Dr. Blair.  This was discussed in detail with the patient.  Pre-operative antibiotics have been ordered.     Haroon Fu, DO

## 2019-06-04 NOTE — ED NOTES
DATE:  6/4/2019   TIME OF RECEIPT FROM LAB:  0514  LAB TEST:  lactic  LAB VALUE:  3.1  RESULTS GIVEN WITH READ-BACK TO (PROVIDER):  Cole  TIME LAB VALUE REPORTED TO PROVIDER:   0514

## 2019-06-04 NOTE — ANESTHESIA PREPROCEDURE EVALUATION
Anesthesia Pre-Procedure Evaluation    Patient: Adrianne Phan   MRN: [id number] : 1960          Preoperative Diagnosis: appendicitis    Procedure(s):  APPENDECTOMY, LAPAROSCOPIC    Past Medical History:   Diagnosis Date     ASCUS favor benign 2015    Neg HPV - Plan cotest in 3 years     PAP SMEAR OF CERVIX W ASCUS 2006 Ascus x 2.  Hpv- x 2.      PONV (postoperative nausea and vomiting)      Past Surgical History:   Procedure Laterality Date     COLONOSCOPY  10/26/2012    Procedure: COLONOSCOPY;  colonoscopy;  Surgeon: Nai Bonilla MD;  Location: [facility]     SURGICAL HISTORY OF -   [id number]    bilateral tubal ligation       Anesthesia Evaluation     . Pt has had prior anesthetic. Type: General and MAC    History of anesthetic complications   - PONV        ROS/MED HX    ENT/Pulmonary:  - neg pulmonary ROS     Neurologic:  - neg neurologic ROS     Cardiovascular:     (+) Dyslipidemia, ----. : . . . :. .       METS/Exercise Tolerance:  >4 METS   Hematologic:  - neg hematologic  ROS       Musculoskeletal:  - neg musculoskeletal ROS       GI/Hepatic:  - neg GI/hepatic ROS       Renal/Genitourinary:  - ROS Renal section negative       Endo:  - neg endo ROS       Psychiatric:  - neg psychiatric ROS       Infectious Disease:  - neg infectious disease ROS       Malignancy:      - no malignancy   Other:    - neg other ROS                      Physical Exam  Normal systems: cardiovascular, pulmonary and dental    Airway   Mallampati: I  TM distance: >3 FB  Neck ROM: full    Dental     Cardiovascular       Pulmonary             Lab Results   Component Value Date    WBC 10.0 2019    HGB 13.9 2019    HCT 44.0 2019     2019     2019    POTASSIUM 3.6 2019    CHLORIDE 107 2019    CO2 27 2019    BUN 13 2019    CR 0.81 2019     (H) 2019    KASSIDY 9.6 2019    ALBUMIN 4.0 2019    PROTTOTAL 7.2  "06/04/2019    ALT 28 06/04/2019    AST 24 06/04/2019    ALKPHOS 68 06/04/2019    BILITOTAL 0.7 06/04/2019       Preop Vitals  BP Readings from Last 3 Encounters:   06/04/19 121/77   06/11/15 111/61   06/05/15 95/55    Pulse Readings from Last 3 Encounters:   06/04/19 61   06/11/15 55   06/05/15 52      Resp Readings from Last 3 Encounters:   06/04/19 20   10/26/12 16    SpO2 Readings from Last 3 Encounters:   06/04/19 100%   10/26/12 100%      Temp Readings from Last 1 Encounters:   06/04/19 36.6  C (97.9  F) (Oral)    Ht Readings from Last 1 Encounters:   06/04/19 1.702 m (5' 7\")      Wt Readings from Last 1 Encounters:   06/04/19 73.5 kg (162 lb)    Estimated body mass index is 25.37 kg/m  as calculated from the following:    Height as of this encounter: 1.702 m (5' 7\").    Weight as of this encounter: 73.5 kg (162 lb).       Anesthesia Plan      History & Physical Review  History and physical reviewed and following examination; no interval change.    ASA Status:  1 .    NPO Status:  > 6 hours    Plan for General and ETT with Intravenous and Propofol induction. Maintenance will be Balanced.    PONV prophylaxis:  Ondansetron (or other 5HT-3) and Dexamethasone or Solumedrol  Additional equipment: Videolaryngoscope      Postoperative Care  Postoperative pain management:  IV analgesics, Oral pain medications and Multi-modal analgesia.      Consents  Anesthetic plan, risks, benefits and alternatives discussed with:  Patient..                 REE Godoy CRNA  "

## 2019-06-06 LAB — COPATH REPORT: NORMAL

## 2020-02-10 ENCOUNTER — HEALTH MAINTENANCE LETTER (OUTPATIENT)
Age: 60
End: 2020-02-10

## 2020-11-16 ENCOUNTER — HEALTH MAINTENANCE LETTER (OUTPATIENT)
Age: 60
End: 2020-11-16

## 2021-04-03 ENCOUNTER — HEALTH MAINTENANCE LETTER (OUTPATIENT)
Age: 61
End: 2021-04-03

## 2021-09-18 ENCOUNTER — HEALTH MAINTENANCE LETTER (OUTPATIENT)
Age: 61
End: 2021-09-18

## 2021-10-02 ENCOUNTER — HOSPITAL ENCOUNTER (EMERGENCY)
Facility: CLINIC | Age: 61
Discharge: HOME | End: 2021-10-02
Attending: EMERGENCY MEDICINE | Admitting: EMERGENCY MEDICINE
Payer: COMMERCIAL

## 2021-10-02 ENCOUNTER — APPOINTMENT (OUTPATIENT)
Dept: CT IMAGING | Facility: CLINIC | Age: 61
End: 2021-10-02
Attending: EMERGENCY MEDICINE
Payer: COMMERCIAL

## 2021-10-02 ENCOUNTER — APPOINTMENT (OUTPATIENT)
Dept: MRI IMAGING | Facility: CLINIC | Age: 61
End: 2021-10-02
Attending: EMERGENCY MEDICINE
Payer: COMMERCIAL

## 2021-10-02 VITALS
TEMPERATURE: 97.4 F | SYSTOLIC BLOOD PRESSURE: 130 MMHG | BODY MASS INDEX: 24.25 KG/M2 | RESPIRATION RATE: 11 BRPM | HEART RATE: 69 BPM | DIASTOLIC BLOOD PRESSURE: 82 MMHG | HEIGHT: 68 IN | WEIGHT: 160 LBS | OXYGEN SATURATION: 95 %

## 2021-10-02 DIAGNOSIS — G45.4 TRANSIENT GLOBAL AMNESIA: ICD-10-CM

## 2021-10-02 LAB
ANION GAP SERPL CALCULATED.3IONS-SCNC: 6 MMOL/L (ref 3–14)
APTT PPP: 28 SECONDS (ref 22–38)
BASOPHILS # BLD AUTO: 0 10E3/UL (ref 0–0.2)
BASOPHILS NFR BLD AUTO: 1 %
BUN SERPL-MCNC: 17 MG/DL (ref 7–30)
CALCIUM SERPL-MCNC: 9.2 MG/DL (ref 8.5–10.1)
CHLORIDE BLD-SCNC: 110 MMOL/L (ref 94–109)
CO2 SERPL-SCNC: 25 MMOL/L (ref 20–32)
CREAT SERPL-MCNC: 0.91 MG/DL (ref 0.52–1.04)
EOSINOPHIL # BLD AUTO: 0.2 10E3/UL (ref 0–0.7)
EOSINOPHIL NFR BLD AUTO: 5 %
ERYTHROCYTE [DISTWIDTH] IN BLOOD BY AUTOMATED COUNT: 12.3 % (ref 10–15)
GFR SERPL CREATININE-BSD FRML MDRD: 69 ML/MIN/1.73M2
GLUCOSE BLD-MCNC: 101 MG/DL (ref 70–99)
GLUCOSE BLDC GLUCOMTR-MCNC: 106 MG/DL (ref 70–99)
HCT VFR BLD AUTO: 45.1 % (ref 35–47)
HGB BLD-MCNC: 14.8 G/DL (ref 11.7–15.7)
HOLD SPECIMEN: NORMAL
IMM GRANULOCYTES # BLD: 0 10E3/UL
IMM GRANULOCYTES NFR BLD: 0 %
INR PPP: 0.91 (ref 0.85–1.15)
LYMPHOCYTES # BLD AUTO: 1.5 10E3/UL (ref 0.8–5.3)
LYMPHOCYTES NFR BLD AUTO: 33 %
MCH RBC QN AUTO: 29.6 PG (ref 26.5–33)
MCHC RBC AUTO-ENTMCNC: 32.8 G/DL (ref 31.5–36.5)
MCV RBC AUTO: 90 FL (ref 78–100)
MONOCYTES # BLD AUTO: 0.4 10E3/UL (ref 0–1.3)
MONOCYTES NFR BLD AUTO: 8 %
NEUTROPHILS # BLD AUTO: 2.5 10E3/UL (ref 1.6–8.3)
NEUTROPHILS NFR BLD AUTO: 53 %
NRBC # BLD AUTO: 0 10E3/UL
NRBC BLD AUTO-RTO: 0 /100
PLATELET # BLD AUTO: 190 10E3/UL (ref 150–450)
POTASSIUM BLD-SCNC: 3.6 MMOL/L (ref 3.4–5.3)
RBC # BLD AUTO: 5 10E6/UL (ref 3.8–5.2)
SODIUM SERPL-SCNC: 141 MMOL/L (ref 133–144)
TROPONIN I SERPL-MCNC: <0.015 UG/L (ref 0–0.04)
WBC # BLD AUTO: 4.6 10E3/UL (ref 4–11)

## 2021-10-02 PROCEDURE — 70553 MRI BRAIN STEM W/O & W/DYE: CPT

## 2021-10-02 PROCEDURE — 84484 ASSAY OF TROPONIN QUANT: CPT | Performed by: EMERGENCY MEDICINE

## 2021-10-02 PROCEDURE — 99285 EMERGENCY DEPT VISIT HI MDM: CPT | Mod: 25 | Performed by: EMERGENCY MEDICINE

## 2021-10-02 PROCEDURE — 85730 THROMBOPLASTIN TIME PARTIAL: CPT | Performed by: EMERGENCY MEDICINE

## 2021-10-02 PROCEDURE — 255N000002 HC RX 255 OP 636: Performed by: EMERGENCY MEDICINE

## 2021-10-02 PROCEDURE — 250N000009 HC RX 250: Performed by: EMERGENCY MEDICINE

## 2021-10-02 PROCEDURE — 93005 ELECTROCARDIOGRAM TRACING: CPT | Performed by: EMERGENCY MEDICINE

## 2021-10-02 PROCEDURE — 70450 CT HEAD/BRAIN W/O DYE: CPT | Mod: XS

## 2021-10-02 PROCEDURE — 85610 PROTHROMBIN TIME: CPT | Performed by: EMERGENCY MEDICINE

## 2021-10-02 PROCEDURE — 93010 ELECTROCARDIOGRAM REPORT: CPT | Performed by: EMERGENCY MEDICINE

## 2021-10-02 PROCEDURE — G0509 CRIT CARE TELEHEA CONSULT 50: HCPCS | Mod: G0 | Performed by: PHYSICIAN ASSISTANT

## 2021-10-02 PROCEDURE — A9585 GADOBUTROL INJECTION: HCPCS | Performed by: EMERGENCY MEDICINE

## 2021-10-02 PROCEDURE — 250N000011 HC RX IP 250 OP 636: Performed by: EMERGENCY MEDICINE

## 2021-10-02 PROCEDURE — 36415 COLL VENOUS BLD VENIPUNCTURE: CPT | Performed by: EMERGENCY MEDICINE

## 2021-10-02 PROCEDURE — 85025 COMPLETE CBC W/AUTO DIFF WBC: CPT | Performed by: EMERGENCY MEDICINE

## 2021-10-02 PROCEDURE — 70498 CT ANGIOGRAPHY NECK: CPT

## 2021-10-02 PROCEDURE — 80048 BASIC METABOLIC PNL TOTAL CA: CPT | Performed by: EMERGENCY MEDICINE

## 2021-10-02 PROCEDURE — 99291 CRITICAL CARE FIRST HOUR: CPT | Mod: 25 | Performed by: EMERGENCY MEDICINE

## 2021-10-02 RX ORDER — IOPAMIDOL 755 MG/ML
70 INJECTION, SOLUTION INTRAVASCULAR ONCE
Status: COMPLETED | OUTPATIENT
Start: 2021-10-02 | End: 2021-10-02

## 2021-10-02 RX ORDER — GADOBUTROL 604.72 MG/ML
7 INJECTION INTRAVENOUS ONCE
Status: COMPLETED | OUTPATIENT
Start: 2021-10-02 | End: 2021-10-02

## 2021-10-02 RX ADMIN — GADOBUTROL 7 ML: 604.72 INJECTION INTRAVENOUS at 13:37

## 2021-10-02 RX ADMIN — SODIUM CHLORIDE 100 ML: 9 INJECTION, SOLUTION INTRAVENOUS at 12:36

## 2021-10-02 RX ADMIN — IOPAMIDOL 70 ML: 755 INJECTION, SOLUTION INTRAVENOUS at 12:36

## 2021-10-02 ASSESSMENT — ENCOUNTER SYMPTOMS
NAUSEA: 0
CONFUSION: 1
COUGH: 0
FEVER: 0
FACIAL ASYMMETRY: 0
WEAKNESS: 0
BRUISES/BLEEDS EASILY: 0
DIARRHEA: 0
TREMORS: 0
SEIZURES: 0
VOMITING: 0

## 2021-10-02 ASSESSMENT — MIFFLIN-ST. JEOR: SCORE: 1344.26

## 2021-10-02 NOTE — ED NOTES
"Further discussion with . Upon entering room. Pt awoke this am with headache. Took tylenol and went about the day.  reports headaches not atypical on the weekends. Noted it to be their \" wedding anniversary so we went upstairs for some intimate time. Afterwards she went into the bathroom and came out and was discombobulated. She didn't remember her dad was ill, she did not remember we bought a new car last week.\" this occurred at about 10am. Denies recent falls, no concerns of trauma. Denies use of blood thinners.   "

## 2021-10-02 NOTE — ED NOTES
Pt presents to ED with concerns of stroke like symptoms.    Stroke eval called at 12:10 PM, Nicola Worthy MD notified.  Code stroke called at 12:19 PM by Nicola Worthy MD.     Pt is alert and oriented, but has trouble recalling events of the past several weeks including today, does not recall waking up and working this am.     Last known normal: last night vs 6 am?    Dysphagia screening deferred due to clinical situation.

## 2021-10-02 NOTE — ED NOTES
Pt assured to results. NSR on monitor, remembering some parts of last week. Neurologically intact. Will await further orders.

## 2021-10-02 NOTE — DISCHARGE INSTRUCTIONS
You are seen today in the ER for memory loss.  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  This is a very odd illness that we do see more than you would expect.  Based on how other people do with it, I expect he will continue to improve over the next several days.  You may or may not ever remember the things that you had forgotten.  Regardless, this is good news, because there is no evidence that you had a stroke.    Thank you for your patience today, and I hope you have a great rest of your anniversary.

## 2021-10-02 NOTE — ED TRIAGE NOTES
Woke up 6 am to do work, reported a headache, went back to bed, now short term memory loss. Pt does not recall waking up this am, working, having the headache or taking Excedrin for it. Pt is aware that she is at the hospital with concerns of memory loss.

## 2021-10-02 NOTE — CONSULTS
"[facility]    Stroke Consult Note    Reason for Consult: Stroke Code     Chief Complaint: Memory Loss (woke up 6 am to do work, reported a headache, went back to bed, now short term memory loss)      HPI  Adrianne Phan is a 61 year old female who was in her usual state of health this morning other than a mild headache when she then became acutely confused and asking repetitive questions after having sexual intercourse with her  at 10:30 am. He provides the history. She is not sure what happened today. She has no history of any neurologic problems.    Imaging Findings  CT/CTA unremarkable for any acute finding. I discussed with neuroradiologist Dr. Devries around 1pm.    Thrombolytic Treatment   Not given due to minor/isolated/quickly resolving symptoms and stroke mimic: TGA.    Endovascular Treatment  Not initiated due to absence of proximal vessel occlusion    Impression   Transient global amenesia   Her neurologic exam is consistent with memory-only deficits. Sexual intercourse is a known trigger described in the TGA literature. Reassured patient and  that TGA has a predictable course and is benign    Recommendations  - Get brain MRI with and without contrast to rule out the less likely scenario of small infarct mimicking TGA  - Ok to discharge after MRI if short term memory improving at that time.       Thank you for this consult.   I discussed the pt's case with my attending Dr. Christopher Golden PA-C  Neurology  10/02/2021 1:11 PM  To page me or covering stroke neurology team member, click here: AMCOM   Choose \"On Call\" tab at top, then search dropdown box for \"Neurology Adult\", select location, press Enter, then look for stroke/neuro ICU/telestroke.    ______________________________________________________    Clinically Significant Risk Factors Present on Admission                   Past Medical History   Past Medical History:   Diagnosis Date     " ASCUS favor benign 5/2015    Neg HPV - Plan cotest in 3 years     PAP SMEAR OF CERVIX W ASCUS 4/13/2006 4/13/2006 Ascus x 2.  Hpv- x 2.      PONV (postoperative nausea and vomiting)      Past Surgical History   Past Surgical History:   Procedure Laterality Date     COLONOSCOPY  10/26/2012    Procedure: COLONOSCOPY;  colonoscopy;  Surgeon: Nai Bonilla MD;  Location: [facility]     LAPAROSCOPIC APPENDECTOMY N/A 6/4/2019    Procedure: APPENDECTOMY, LAPAROSCOPIC;  Surgeon: Arthur Blair MD;  Location: [facility]     SURGICAL HISTORY OF -   [id number]    bilateral tubal ligation     Medications   Home Meds  Prior to Admission medications    Medication Sig Start Date End Date Taking? Authorizing Provider   HYDROcodone-acetaminophen (NORCO) 5-325 MG tablet Take 1-2 tablets by mouth every 6 hours as needed for pain 6/4/19   Arthur Blair MD   NO ACTIVE MEDICATIONS .    Reported, Patient       Scheduled Meds      Infusion Meds      PRN Meds      Allergies   Allergies   Allergen Reactions     Nkda [No Known Drug Allergies]      Family History   Family History   Problem Relation Age of Onset     Thyroid Disease Mother      Hypertension Mother      Musculoskeletal Disorder Mother      Hypertension Father      Musculoskeletal Disorder Sister      Eye Disorder Brother      Neurologic Disorder Brother      Musculoskeletal Disorder Brother      Colon Cancer Maternal Aunt 70     Family History Negative No family hx of      Social History   Social History     Tobacco Use     Smoking status: Never Smoker   Substance Use Topics     Alcohol use: No     Drug use: No       Review of Systems   Review of systems not obtained due to patient factors - confusion       PHYSICAL EXAMINATION  Temp:  [97.4  F (36.3  C)] 97.4  F (36.3  C)  Pulse:  [68-83] 83  Resp:  [9-16] 9  BP: (143-168)/(82-89) 143/84  SpO2:  [97 %] 97 %     General:  patient lying in bed without any acute distress    HEENT:  normocephalic/atraumatic  Pulmonary:  no  "respiratory distress    Neurologic  Mental Status:  alert, oriented to self, and . Oriented to [place] [facility]. Does not know age or month or season other than \"not winter\". Cannot recall 3 words 5 minutes later, even with cues and then with multiple choice selection. Doesn't remember being asked to remember those words. Does better remembering a diet coke bottle I showed her 5 minutes earlier.   Cranial Nerves:  visual fields intact (tested by nurse), EOMI with normal smooth pursuit, facial sensation intact and symmetric (tested by nurse), facial movements symmetric, hearing not formally tested but intact to conversation, no dysarthria, shoulder shrug equal bilaterally, tongue protrusion midline  Motor:  no abnormal movements, able to move all limbs antigravity spontaneously with no signs of hemiparesis observed, no pronator drift  Reflexes:  unable to test (telestroke)  Sensory:  light touch sensation intact and symmetric throughout upper and lower extremities (assessed by nurse), no extinction on double simultaneous stimulation (assessed by nurse)  Coordination:  normal finger-to-nose and heel-to-shin bilaterally without dysmetria, rapid alternating movements symmetric  Station/Gait:  unable to test due to telestroke      Dysphagia Screen  Per Nursing    Stroke Scales    NIHSS  Interval     Interval Comments     1a. Level of Consciousness 0-->Alert, keenly responsive   1b. LOC Questions 2-->Answers neither question correctly   1c. LOC Commands 0-->Performs both tasks correctly   2.   Best Gaze 0-->Normal   3.   Visual 0-->No visual loss   4.   Facial Palsy 0-->Normal symmetrical movements   5a. Motor Arm, Left 0-->No drift, limb holds 90 (or 45) degrees for full 10 secs   5b. Motor Arm, Right 0-->No drift, limb holds 90 (or 45) degrees for full 10 secs   6a. Motor Leg, Left 0-->No drift, leg holds 30 degree position for full 5 secs   6b. Motor Leg, right 0-->No drift, leg holds 30 degree position for full " 5 secs   7.   Limb Ataxia 0-->Absent   8.   Sensory 0-->Normal, no sensory loss   9.   Best Language 0-->No aphasia, normal   10. Dysarthria 0-->Normal   11. Extinction and Inattention  0-->No abnormality   Total 2 (10/02/21 1250)       Modified Pettis Score (Pre-morbid)  0-No deficits    Imaging  I personally reviewed all imaging; relevant findings per HPI.     Lab Results Data   CBC  Recent Labs   Lab 10/02/21  1221   WBC 4.6   RBC 5.00   HGB 14.8   HCT 45.1        Basic Metabolic Panel    Recent Labs   Lab 10/02/21  1220 10/02/21  1214     --    POTASSIUM 3.6  --    CHLORIDE 110*  --    CO2 25  --    BUN 17  --    CR 0.91  --    * 106*   KASSIDY 9.2  --      Liver Panel  No results for input(s): PROTTOTAL, ALBUMIN, BILITOTAL, ALKPHOS, AST, ALT, BILIDIRECT in the last 168 hours.  INR    Recent Labs   Lab Test 10/02/21  1220   INR 0.91      Lipid Profile  No lab results found.  A1C  No lab results found.  Troponin I    Recent Labs   Lab 10/02/21  1220   TROPONIN <0.015          Stroke Code Data Data   Stroke Code Data  (for stroke code with tele)  Stroke code activated 10/02/21   1219   First stroke provider response 10/02/21   1220   Video start time 10/02/21   1239   Video end time 10/02/21   1258   Last known normal 10/02/21   1029   Time of discovery  (or onset of symptoms)  10/02/21   1030   Head CT read by Stroke Neuro Dr/Provider 10/02/21   1231   Was stroke code de-escalated? Yes 10/02/21 1301  symptoms not likely caused by stroke        Telestroke Service Details  Type of service telemedicine diagnostic assessment of acute neurological changes   Reason telemedicine is appropriate patient requires assessment with a specialist for diagnosis and treatment of neurological symptoms   Mode of transmission secure interactive audio and video communication per Ramses   Originating site (patient location) [facility]    Distant site (provider location) [facility]       I have personally spent a total of 60 minutes providing critical care services supervising this patient's stroke code activation.  I personally reviewed all lab values and radiology images.  I evaluated and directed care for the patient's critical condition.

## 2021-10-02 NOTE — ED PROVIDER NOTES
"  History     Chief Complaint   Patient presents with     Memory Loss     woke up 6 am to do work, reported a headache, went back to bed, now short term memory loss     HPI  Adrianne Phan is a 61 year old female who presents with memory loss.  Patient is generally healthy.  Today is her anniversary and she and her  this morning had breakfast.  She did have a small headache that has resolved since then.  Before other children came over they decided to have sexual relations.  They did do this and then afterwards the patient went to the bathroom and came out and told her  that she was \"discombobulated.\"  She do not have a headache at that time per her  and per the patient.  She is not slurring her words.  She was not weak.  She did not fall.  She was unable to remember that it was her anniversary, that her children are coming over, and that her father was recently ill.  Her memory loss seems to be short-term as she her  said she does remember things in the past.    No fevers or chills or recent travel.  No chest pain, no cough, no sore throat, no nausea or vomiting.  Has not been sick recently.    Allergies:  Allergies   Allergen Reactions     Nkda [No Known Drug Allergies]        Problem List:    Patient Active Problem List    Diagnosis Date Noted     CARDIOVASCULAR SCREENING; LDL GOAL LESS THAN 160 10/31/2010     Priority: Medium     Female stress incontinence 12/11/2008     Priority: Medium     (Problem list name updated by automated process. Provider to review and confirm.)       Papanicolaou smear of cervix with atypical squamous cells of undetermined significance (ASC-US) 04/13/2006     Priority: Medium     8/18/05: Pap - ASCUS, Neg HPV  1/26/06: Pap - ASCUS, Neg HPV  10/06, 07, 08, 11: Paps - Normal.   5/7/15: Pap - ASCUS, Neg HPV. Plan cotest in 3 years.           Past Medical History:    Past Medical History:   Diagnosis Date     ASCUS favor benign 5/2015     PAP SMEAR OF CERVIX W " "ASCUS 4/13/2006     PONV (postoperative nausea and vomiting)        Past Surgical History:    Past Surgical History:   Procedure Laterality Date     COLONOSCOPY  10/26/2012    Procedure: COLONOSCOPY;  colonoscopy;  Surgeon: Nai Bonilla MD;  Location: [facility]     LAPAROSCOPIC APPENDECTOMY N/A 6/4/2019    Procedure: APPENDECTOMY, LAPAROSCOPIC;  Surgeon: Arthur Blair MD;  Location: [facility]     SURGICAL HISTORY OF -   [id number]    bilateral tubal ligation       Family History:    Family History   Problem Relation Age of Onset     Thyroid Disease Mother      Hypertension Mother      Musculoskeletal Disorder Mother      Hypertension Father      Musculoskeletal Disorder Sister      Eye Disorder Brother      Neurologic Disorder Brother      Musculoskeletal Disorder Brother      Colon Cancer Maternal Aunt 70     Family History Negative No family hx of        Social History:  Marital Status:   [2]  Social History     Tobacco Use     Smoking status: Never Smoker   Substance Use Topics     Alcohol use: No     Drug use: No        Medications:    HYDROcodone-acetaminophen (NORCO) 5-325 MG tablet  NO ACTIVE MEDICATIONS          Review of Systems   Constitutional: Negative for fever.   Respiratory: Negative for cough.    Cardiovascular: Negative for chest pain.   Gastrointestinal: Negative for diarrhea, nausea and vomiting.   Allergic/Immunologic: Negative for immunocompromised state.   Neurological: Negative for tremors, seizures, facial asymmetry and weakness.        Short term memory loss   Hematological: Does not bruise/bleed easily.   Psychiatric/Behavioral: Positive for confusion.   All other systems reviewed and are negative.      Physical Exam   BP: (!) 144/89  Pulse: 76  Temp: 97.4  F (36.3  C)  Resp: 16  Height: 172.7 cm (5' 8\")  Weight: 72.6 kg (160 lb)  SpO2: 97 %      Physical Exam  Vitals reviewed.   Constitutional:       General: She is not in acute distress.  HENT:      Head: Normocephalic.      " Right Ear: External ear normal.      Left Ear: External ear normal.      Nose: No congestion.      Mouth/Throat:      Mouth: Mucous membranes are moist.   Eyes:      General:         Right eye: No discharge.         Left eye: No discharge.   Cardiovascular:      Rate and Rhythm: Normal rate.      Pulses: Normal pulses.   Pulmonary:      Effort: No respiratory distress.      Breath sounds: No stridor.   Abdominal:      General: There is no distension.   Musculoskeletal:         General: No swelling.      Cervical back: No rigidity.   Skin:     Coloration: Skin is not jaundiced.   Neurological:      Mental Status: She is alert.      Comments: +short term memory loss, did not know it was her anniversary. Knows , location of birth, year. Speaking normally. Strength is 5/5 in upper and lower extremities b/l. No facial droop. No pronator drift.    Psychiatric:      Comments: Very nice         ED Course     ED Course as of Oct 02 1648   Sat Oct 02, 2021   1225 Spoke with stroke neuro. Agree with plan, also favor TGA as dx.       1246 Patient in scanner.      1300 Spoke to Stroke Neurology. Imaging unremarkable based on their read. Recommend MRI brain with and without.       1413 Radiologist Dr. Treviño from radiology says MRI imaging does not show acute process.       1537 Spoke to Dr. Hanson. Aware of imaging, ok with patient going home.       1646 Updated patient, all questions answered. Feeling well. Very appreciative. Memory returning. Will discharge with strict ER precautions.         Procedures              EKG Interpretation:      Interpreted by Nicola Worthy MD  Time reviewed: 119  Symptoms at time of EKG: memory loss  Rhythm: normal sinus   Rate: normal  Axis: normal  Ectopy: none  Conduction: normal  ST Segments/ T Waves: No ST-T wave changes  Q Waves: none  Comparison to prior: No old EKG available    Clinical Impression: Poor R wave progression, otherwise reassuring          Critical Care time:  was 35  minutes for this patient excluding procedures.         Results for orders placed or performed during the hospital encounter of 10/02/21 (from the past 24 hour(s))   Glucose by meter   Result Value Ref Range    GLUCOSE BY METER POCT 106 (H) 70 - 99 mg/dL   [place] Draw    Narrative    The following orders were created for panel order [place] Draw.  Procedure                               Abnormality         Status                     ---------                               -----------         ------                     Extra Blue Top Tube[[id number]]                              Final result               Extra Red Top Tube[[id number]]                                                          Extra Green Top (Lithium...[[id number]]                      Final result               Extra Green Top (Lithium...[[id number]]                      Final result               Extra Purple Top Tube[[id number]]                            Final result                 Please view results for these tests on the individual orders.   Extra Blue Top Tube   Result Value Ref Range    Hold Specimen JIC    Extra Green Top (Lithium Heparin) Tube   Result Value Ref Range    Hold Specimen JIC    Basic metabolic panel   Result Value Ref Range    Sodium 141 133 - 144 mmol/L    Potassium 3.6 3.4 - 5.3 mmol/L    Chloride 110 (H) 94 - 109 mmol/L    Carbon Dioxide (CO2) 25 20 - 32 mmol/L    Anion Gap 6 3 - 14 mmol/L    Urea Nitrogen 17 7 - 30 mg/dL    Creatinine 0.91 0.52 - 1.04 mg/dL    Calcium 9.2 8.5 - 10.1 mg/dL    Glucose 101 (H) 70 - 99 mg/dL    GFR Estimate 69 >60 mL/min/1.73m2   INR   Result Value Ref Range    INR 0.91 0.85 - 1.15   Partial thromboplastin time   Result Value Ref Range    aPTT 28 22 - 38 Seconds   Troponin I   Result Value Ref Range    Troponin I <0.015 0.000 - 0.045 ug/L   Extra Green Top (Lithium Heparin) Tube   Result Value Ref Range    Hold Specimen JIC    Extra Purple Top Tube   Result Value Ref Range    Hold Specimen JIC     CBC with Platelets & Differential    Narrative    The following orders were created for panel order CBC with Platelets & Differential.  Procedure                               Abnormality         Status                     ---------                               -----------         ------                     CBC with platelets and d...[[id number]]                      Final result                 Please view results for these tests on the individual orders.   CBC with platelets and differential   Result Value Ref Range    WBC Count 4.6 4.0 - 11.0 10e3/uL    RBC Count 5.00 3.80 - 5.20 10e6/uL    Hemoglobin 14.8 11.7 - 15.7 g/dL    Hematocrit 45.1 35.0 - 47.0 %    MCV 90 78 - 100 fL    MCH 29.6 26.5 - 33.0 pg    MCHC 32.8 31.5 - 36.5 g/dL    RDW 12.3 10.0 - 15.0 %    Platelet Count 190 150 - 450 10e3/uL    % Neutrophils 53 %    % Lymphocytes 33 %    % Monocytes 8 %    % Eosinophils 5 %    % Basophils 1 %    % Immature Granulocytes 0 %    NRBCs per 100 WBC 0 <1 /100    Absolute Neutrophils 2.5 1.6 - 8.3 10e3/uL    Absolute Lymphocytes 1.5 0.8 - 5.3 10e3/uL    Absolute Monocytes 0.4 0.0 - 1.3 10e3/uL    Absolute Eosinophils 0.2 0.0 - 0.7 10e3/uL    Absolute Basophils 0.0 0.0 - 0.2 10e3/uL    Absolute Immature Granulocytes 0.0 <=0.0 10e3/uL    Absolute NRBCs 0.0 10e3/uL   CT Head w/o Contrast    Narrative    EXAM: CT HEAD W/O CONTRAST  LOCATION: [facility]  DATE/TIME: 10/2/2021 12:26 PM    INDICATION: Code Stroke to evaluate for potential thrombolysis and thrombectomy.  PLEASE READ IMMEDIATELY.  COMPARISON: MRI scan 10/2/2021.  TECHNIQUE: Routine CT Head without IV contrast. Multiplanar reformats. Dose reduction techniques were used.    FINDINGS:  INTRACRANIAL CONTENTS: No intracranial hemorrhage, extraaxial collection, or mass effect.  No CT evidence of acute infarct. Normal parenchymal attenuation. Normal ventricles and sulci. Posterior fossa structures including cerebellar hemispheres,  fourth   ventricle and CP angle cisterns are clear. Nasopharynx is clear. Region of the sella and suprasellar cistern are clear.    VISUALIZED ORBITS/SINUSES/MASTOIDS: No intraorbital abnormality. No paranasal sinus mucosal disease. No middle ear or mastoid effusion.    BONES/SOFT TISSUES: No acute abnormality.      Impression    IMPRESSION:  1.  Normal head CT.    Results were called to the ordering provider at 10/2/2021 2:01 PM   CTA Head Neck with Contrast    Narrative    CTA  HEAD NECK WITH CONTRAST 10/2/2021 12:38 PM     HISTORY: Code Stroke to evaluate for potential thrombolysis and  thrombectomy. Acute onset of memory loss.    TECHNIQUE: Axial images were obtained through the head and neck with  intravenous contrast. 70 mL of gadolinium given. Multiplanar  reconstructions were performed. 3-D reconstructions of remote  workstation for CT angiography were also acquired. Radiation dose for  this scan was reduced using automated exposure control, adjustment of  the mA and/or kV according to patient size, or iterative  reconstruction technique.. Carotid stenosis were evaluated by  comparing the caliber of the proximal internal carotid artery to the  caliber of the distal internal carotid artery.    COMPARISON: None.    FINDINGS:    Brachiocephalic vessels: Normal.    Right carotid system: Carotid bifurcations are widely patent. There is  some mild narrowing of the right internal carotid artery with some  beading. FINDINGS are consistent with some fibromuscular dysplasia.  There is no evidence for any acute dissection.    Left carotid system: There is some evidence of some fibromuscular  dysplasia in the cervical internal carotid artery. Carotid bifurcation  is widely patent. There is no evidence for dissection.    Right vertebral artery: Normal nondominant vessel.    Left vertebral artery: Normal.    [place]: Normal. 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 This is an anatomic  variant.    Other findings: None.      Impression    IMPRESSION:  1. 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  2. No evidence for significant stenosis, dissection, thromboembolism,  or aneurysm.    JENNIFER OSHEA MD         SYSTEM ID:  [id number]   MR Brain w/o & w Contrast    Narrative    EXAM: MR BRAIN W/O and W CONTRAST  LOCATION: [facility]  DATE/TIME: 10/2/2021 1:22 PM    INDICATION: Memory Loss  COMPARISON: CT brain  CONTRAST: 7ml gadavist  TECHNIQUE: Routine multiplanar multisequence head MRI without and with intravenous contrast.    FINDINGS:  INTRACRANIAL CONTENTS: No acute or subacute infarct. No mass, acute hemorrhage, or extra-axial fluid collections. Normal brain parenchymal signal. Normal ventricles and sulci. Normal position of the cerebellar tonsils. No pathologic contrast enhancement.   Posterior fossa structures including cerebellar hemispheres, fourth ventricle and CP angle cisterns are clear. Nasopharynx is clear. Region of the sella and suprasellar cistern are clear.    Benign venous anomaly left frontal lobe.    SELLA: No abnormality accounting for technique.    OSSEOUS STRUCTURES/SOFT TISSUES: Normal marrow signal. The major intracranial vascular flow voids are maintained.     ORBITS: No abnormality accounting for technique.     SINUSES/MASTOIDS: No paranasal sinus mucosal disease. No middle ear or mastoid effusion.       Impression    IMPRESSION:  1.  Normal head MRI.    2.  Benign venous anomaly left frontal lobe which needs no further follow-up.       Extra Tube ([place] Draw)    Narrative    The following orders were created for panel order Extra Tube ([place] Draw).  Procedure                               Abnormality         Status                     ---------                               -----------         ------                     Extra Red Top  Tube[[id number]]                               Final result                 Please view results for these tests on the individual orders.   Extra Red Top Tube   Result Value Ref Range    Hold Specimen JIC        Medications   iopamidol (ISOVUE-370) solution 70 mL (70 mLs Intravenous Given 10/2/21 1236)   sodium chloride 0.9 % bag 500mL for CT scan flush use (100 mLs Intravenous Given 10/2/21 1236)   gadobutrol (GADAVIST) injection 7 mL (7 mLs Intravenous Given 10/2/21 1337)       Assessments & Plan (with Medical Decision Making)     The symptoms of this patient in the exam are most consistent with transient global amnesia.  Because we are within the thrombolytic window I am going to activate as a tier 1 code stroke.  Will get labs and put in orders for imaging.    I have reviewed the nursing notes.    I have reviewed the findings, diagnosis, plan and need for follow up with the patient.  This note was in part created using dictation software in an effort to speed and improve patient care; any typos should be read with the frequent shortcomings of this technology in mind.       Critical Care Addendum    My initial assessment, based on my review of nursing observations, review of vital signs, focused history, physical exam, discussion with consultants and  of patient and interpretation of exam and history , established that Adrianne Phan has focal neurologic abnormalities, which requires immediate intervention, and therefore she is critically ill.     After the initial assessment, the care team initiated multiple lab tests and consulted with stroke neurology and stroke radiology teams to provide stabilization care. Due to the critical nature of this patient, I reassessed vital signs, physical exam, interpretation of imaging and neurologic status multiple times prior to her disposition.     Time also spent performing documentation, discussion with family to obtain medical information for decision making,  reviewing test results and discussion with consultants.     Critical care time (excluding teaching time and procedures): 35 minutes.     New Prescriptions    No medications on file       Final diagnoses:   Transient global amnesia       10/2/2021   [facility] EMERGENCY DEPT     Nicola Worthy MD  10/02/21 [id number]

## 2022-02-27 ENCOUNTER — HEALTH MAINTENANCE LETTER (OUTPATIENT)
Age: 62
End: 2022-02-27

## 2022-04-24 ENCOUNTER — HEALTH MAINTENANCE LETTER (OUTPATIENT)
Age: 62
End: 2022-04-24

## 2022-11-19 ENCOUNTER — HEALTH MAINTENANCE LETTER (OUTPATIENT)
Age: 62
End: 2022-11-19

## 2023-05-01 ENCOUNTER — HOSPITAL ENCOUNTER (OUTPATIENT)
Dept: MAMMOGRAPHY | Facility: CLINIC | Age: 63
Discharge: HOME | End: 2023-05-01
Attending: FAMILY MEDICINE | Admitting: FAMILY MEDICINE
Payer: COMMERCIAL

## 2023-05-01 DIAGNOSIS — Z12.31 VISIT FOR SCREENING MAMMOGRAM: ICD-10-CM

## 2023-05-01 PROCEDURE — 77067 SCR MAMMO BI INCL CAD: CPT

## 2023-05-24 ENCOUNTER — HOSPITAL ENCOUNTER (OUTPATIENT)
Dept: ULTRASOUND IMAGING | Facility: CLINIC | Age: 63
Discharge: HOME | End: 2023-05-24
Attending: FAMILY MEDICINE
Payer: COMMERCIAL

## 2023-05-24 ENCOUNTER — HOSPITAL ENCOUNTER (OUTPATIENT)
Dept: MAMMOGRAPHY | Facility: CLINIC | Age: 63
Discharge: HOME | End: 2023-05-24
Attending: FAMILY MEDICINE
Payer: COMMERCIAL

## 2023-05-24 DIAGNOSIS — R92.8 ABNORMAL MAMMOGRAM: ICD-10-CM

## 2023-05-24 PROCEDURE — 77061 BREAST TOMOSYNTHESIS UNI: CPT | Mod: RT

## 2023-05-24 PROCEDURE — 76642 ULTRASOUND BREAST LIMITED: CPT | Mod: RT

## 2023-06-01 ENCOUNTER — HEALTH MAINTENANCE LETTER (OUTPATIENT)
Age: 63
End: 2023-06-01

## 2023-06-19 ASSESSMENT — ENCOUNTER SYMPTOMS
ABDOMINAL PAIN: 0
PARESTHESIAS: 0
DIZZINESS: 0
CHILLS: 0
DIARRHEA: 0
SHORTNESS OF BREATH: 0
NERVOUS/ANXIOUS: 0
EYE PAIN: 0
DYSURIA: 0
HEMATURIA: 0
HEMATOCHEZIA: 0
WEAKNESS: 0
ARTHRALGIAS: 0
PALPITATIONS: 0
FREQUENCY: 0
COUGH: 0
NAUSEA: 0
HEARTBURN: 0
JOINT SWELLING: 0
CONSTIPATION: 0
SORE THROAT: 0
MYALGIAS: 0
FEVER: 0
HEADACHES: 0
BREAST MASS: 0

## 2023-06-23 ENCOUNTER — TELEPHONE (OUTPATIENT)
Dept: OTHER | Facility: CLINIC | Age: 63
End: 2023-06-23

## 2023-06-23 ENCOUNTER — OFFICE VISIT (OUTPATIENT)
Dept: FAMILY MEDICINE | Facility: CLINIC | Age: 63
End: 2023-06-23
Payer: COMMERCIAL

## 2023-06-23 ENCOUNTER — LAB (OUTPATIENT)
Dept: LAB | Facility: CLINIC | Age: 63
End: 2023-06-23
Payer: COMMERCIAL

## 2023-06-23 VITALS
WEIGHT: 166 LBS | TEMPERATURE: 97.6 F | DIASTOLIC BLOOD PRESSURE: 69 MMHG | OXYGEN SATURATION: 100 % | HEART RATE: 59 BPM | BODY MASS INDEX: 26.06 KG/M2 | SYSTOLIC BLOOD PRESSURE: 131 MMHG | RESPIRATION RATE: 20 BRPM | HEIGHT: 67 IN

## 2023-06-23 DIAGNOSIS — Z00.00 ROUTINE GENERAL MEDICAL EXAMINATION AT A HEALTH CARE FACILITY: ICD-10-CM

## 2023-06-23 DIAGNOSIS — Z12.11 SCREEN FOR COLON CANCER: ICD-10-CM

## 2023-06-23 DIAGNOSIS — I77.3 FIBROMUSCULAR DYSPLASIA (H): ICD-10-CM

## 2023-06-23 DIAGNOSIS — Z13.220 SCREENING FOR HYPERLIPIDEMIA: ICD-10-CM

## 2023-06-23 DIAGNOSIS — Z12.4 CERVICAL CANCER SCREENING: ICD-10-CM

## 2023-06-23 DIAGNOSIS — Z00.00 ROUTINE GENERAL MEDICAL EXAMINATION AT A HEALTH CARE FACILITY: Primary | ICD-10-CM

## 2023-06-23 LAB
ANION GAP SERPL CALCULATED.3IONS-SCNC: 9 MMOL/L (ref 7–15)
BASOPHILS # BLD AUTO: 0 10E3/UL (ref 0–0.2)
BASOPHILS NFR BLD AUTO: 0 %
BUN SERPL-MCNC: 23.2 MG/DL (ref 8–23)
CALCIUM SERPL-MCNC: 9.9 MG/DL (ref 8.8–10.2)
CHLORIDE SERPL-SCNC: 104 MMOL/L (ref 98–107)
CHOLEST SERPL-MCNC: 226 MG/DL
CREAT SERPL-MCNC: 1 MG/DL (ref 0.51–0.95)
DEPRECATED HCO3 PLAS-SCNC: 29 MMOL/L (ref 22–29)
EOSINOPHIL # BLD AUTO: 0.3 10E3/UL (ref 0–0.7)
EOSINOPHIL NFR BLD AUTO: 7 %
ERYTHROCYTE [DISTWIDTH] IN BLOOD BY AUTOMATED COUNT: 12.9 % (ref 10–15)
GFR SERPL CREATININE-BSD FRML MDRD: 63 ML/MIN/1.73M2
GLUCOSE SERPL-MCNC: 103 MG/DL (ref 70–99)
HCT VFR BLD AUTO: 42.1 % (ref 35–47)
HDLC SERPL-MCNC: 57 MG/DL
HGB BLD-MCNC: 13.4 G/DL (ref 11.7–15.7)
IMM GRANULOCYTES # BLD: 0 10E3/UL
IMM GRANULOCYTES NFR BLD: 0 %
LDLC SERPL CALC-MCNC: 144 MG/DL
LYMPHOCYTES # BLD AUTO: 1.6 10E3/UL (ref 0.8–5.3)
LYMPHOCYTES NFR BLD AUTO: 36 %
MCH RBC QN AUTO: 29.4 PG (ref 26.5–33)
MCHC RBC AUTO-ENTMCNC: 31.8 G/DL (ref 31.5–36.5)
MCV RBC AUTO: 92 FL (ref 78–100)
MONOCYTES # BLD AUTO: 0.4 10E3/UL (ref 0–1.3)
MONOCYTES NFR BLD AUTO: 9 %
NEUTROPHILS # BLD AUTO: 2.1 10E3/UL (ref 1.6–8.3)
NEUTROPHILS NFR BLD AUTO: 48 %
NONHDLC SERPL-MCNC: 169 MG/DL
PLATELET # BLD AUTO: 176 10E3/UL (ref 150–450)
POTASSIUM SERPL-SCNC: 3.8 MMOL/L (ref 3.4–5.3)
RBC # BLD AUTO: 4.56 10E6/UL (ref 3.8–5.2)
SODIUM SERPL-SCNC: 142 MMOL/L (ref 136–145)
TRIGL SERPL-MCNC: 124 MG/DL
TSH SERPL DL<=0.005 MIU/L-ACNC: 2.03 UIU/ML (ref 0.3–4.2)
WBC # BLD AUTO: 4.5 10E3/UL (ref 4–11)

## 2023-06-23 PROCEDURE — 84443 ASSAY THYROID STIM HORMONE: CPT

## 2023-06-23 PROCEDURE — 80061 LIPID PANEL: CPT

## 2023-06-23 PROCEDURE — 90471 IMMUNIZATION ADMIN: CPT | Performed by: FAMILY MEDICINE

## 2023-06-23 PROCEDURE — 36415 COLL VENOUS BLD VENIPUNCTURE: CPT

## 2023-06-23 PROCEDURE — G0145 SCR C/V CYTO,THINLAYER,RESCR: HCPCS | Performed by: FAMILY MEDICINE

## 2023-06-23 PROCEDURE — 87624 HPV HI-RISK TYP POOLED RSLT: CPT | Performed by: FAMILY MEDICINE

## 2023-06-23 PROCEDURE — 85025 COMPLETE CBC W/AUTO DIFF WBC: CPT

## 2023-06-23 PROCEDURE — 99386 PREV VISIT NEW AGE 40-64: CPT | Mod: 25 | Performed by: FAMILY MEDICINE

## 2023-06-23 PROCEDURE — 90715 TDAP VACCINE 7 YRS/> IM: CPT | Performed by: FAMILY MEDICINE

## 2023-06-23 PROCEDURE — 80048 BASIC METABOLIC PNL TOTAL CA: CPT

## 2023-06-23 PROCEDURE — 99213 OFFICE O/P EST LOW 20 MIN: CPT | Mod: 25 | Performed by: FAMILY MEDICINE

## 2023-06-23 ASSESSMENT — ENCOUNTER SYMPTOMS
SHORTNESS OF BREATH: 0
WEAKNESS: 0
COUGH: 0
JOINT SWELLING: 0
NAUSEA: 0
CHILLS: 0
FEVER: 0
DYSURIA: 0
PARESTHESIAS: 0
EYE PAIN: 0
PALPITATIONS: 0
ABDOMINAL PAIN: 0
DIARRHEA: 0
BREAST MASS: 0
DIZZINESS: 0
HEARTBURN: 0
NERVOUS/ANXIOUS: 0
ARTHRALGIAS: 0
HEMATURIA: 0
CONSTIPATION: 0
SORE THROAT: 0
MYALGIAS: 0
HEADACHES: 0
HEMATOCHEZIA: 0
FREQUENCY: 0

## 2023-06-23 ASSESSMENT — PAIN SCALES - GENERAL: PAINLEVEL: NO PAIN (0)

## 2023-06-23 NOTE — TELEPHONE ENCOUNTER
Appt Note:  Referred to [facility] by Ama Palmer DO for FMD    Pt needs to be scheduled for New Vascular Consult with Dr. Wells or Dr. Topete.  Will route to scheduling to coordinate an appointment at next available.    Mitzi Levine, ALBERTN, RN  [facility]

## 2023-06-23 NOTE — PROGRESS NOTES
SUBJECTIVE:   CC: Adrianne is an 63 year old who presents for preventive health visit.       6/23/2023     2:45 PM   Additional Questions   Roomed by Mela   Accompanied by self     Healthy Habits:     Getting at least 3 servings of Calcium per day:  Yes    Bi-annual eye exam:  Yes    Dental care twice a year:  NO    Sleep apnea or symptoms of sleep apnea:  None    Diet:  Regular (no restrictions)    Frequency of exercise:  4-5 days/week    Duration of exercise:  15-30 minutes    Taking medications regularly:  Yes    Medication side effects:  Not applicable    PHQ-2 Total Score: 0    Additional concerns today:  No    -Staying active     -Stopped having periods about age 60.     -Last Pap in 2015 ASCUS, negative HPV    -Overdue for colonoscopy    -Was seen in ER in 2021 for global amnesia.  Work-up was overall unrevealing.  Did show concern for fibromuscular dysplasia of her right cervical internal carotid artery.     Today's PHQ-2 Score:       6/23/2023     2:42 PM   PHQ-2 ( 1999 [employer])   Q1: Little interest or pleasure in doing things 0   Q2: Feeling down, depressed or hopeless 0   PHQ-2 Score 0   Q1: Little interest or pleasure in doing things Not at all   Q2: Feeling down, depressed or hopeless Not at all   PHQ-2 Score 0     Have you ever done Advance Care Planning? (For example, a Health Directive, POLST, or a discussion with a medical provider or your loved ones about your wishes): No, advance care planning information given to patient to review.  Patient plans to discuss their wishes with loved ones or provider.      Social History     Tobacco Use     Smoking status: Never     Smokeless tobacco: Not on file   Substance Use Topics     Alcohol use: No           6/19/2023     8:01 PM   Alcohol Use   Prescreen: >3 drinks/day or >7 drinks/week? No     Reviewed orders with patient.  Reviewed health maintenance and updated orders accordingly - Yes  Lab work is in process    Breast Cancer Screening:    FHS-7:        5/1/2023     5:26 PM 6/19/2023     8:04 PM   Breast CA Risk Assessment (FHS-7)   Did any of your first-degree relatives have breast or ovarian cancer? No No   Did any of your relatives have bilateral breast cancer? No No   Did any man in your family have breast cancer? No No   Did any woman in your family have breast and ovarian cancer? No No   Did any woman in your family have breast cancer before age 50 y? No No   Do you have 2 or more relatives with breast and/or ovarian cancer? No No   Do you have 2 or more relatives with breast and/or bowel cancer? No No       Mammogram Screening: Recommended mammography every 1-2 years with patient discussion and risk factor consideration  Pertinent mammograms are reviewed under the imaging tab.    History of abnormal Pap smear: NO - age 30-65 PAP every 5 years with negative HPV co-testing recommended      Latest Ref Rng & Units 5/7/2015     8:24 AM 5/7/2015    12:00 AM 12/22/2011    12:41 PM   PAP / HPV   PAP (Historical)   ASC-US  NIL    HPV 16 DNA NEG Negative      HPV 18 DNA NEG Negative      Other HR HPV NEG Negative        Reviewed and updated as needed this visit by clinical staff   Tobacco  Allergies  Meds   Med Hx  Surg Hx  Fam Hx          Reviewed and updated as needed this visit by Provider   Tobacco     Med Hx  Surg Hx  Fam Hx         Past Medical History:   Diagnosis Date     ASCUS favor benign 5/2015    Neg HPV - Plan cotest in 3 years     PAP SMEAR OF CERVIX W ASCUS 4/13/2006 4/13/2006 Ascus x 2.  Hpv- x 2.      PONV (postoperative nausea and vomiting)       Past Surgical History:   Procedure Laterality Date     COLONOSCOPY  10/26/2012    Procedure: COLONOSCOPY;  colonoscopy;  Surgeon: Nai Bonilla MD;  Location: [facility]     LAPAROSCOPIC APPENDECTOMY N/A 6/4/2019    Procedure: APPENDECTOMY, LAPAROSCOPIC;  Surgeon: Arthur Blair MD;  Location: [facility]     SURGICAL HISTORY OF -   [id number]    bilateral tubal ligation       Review of Systems  "  Constitutional: Negative for chills and fever.   HENT: Negative for congestion, ear pain, hearing loss and sore throat.    Eyes: Negative for pain and visual disturbance.   Respiratory: Negative for cough and shortness of breath.    Cardiovascular: Negative for chest pain, palpitations and peripheral edema.   Gastrointestinal: Negative for abdominal pain, constipation, diarrhea, heartburn, hematochezia and nausea.   Breasts:  Negative for tenderness, breast mass and discharge.   Genitourinary: Negative for dysuria, frequency, genital sores, hematuria, pelvic pain, urgency, vaginal bleeding and vaginal discharge.   Musculoskeletal: Negative for arthralgias, joint swelling and myalgias.   Skin: Negative for rash.   Neurological: Negative for dizziness, weakness, headaches and paresthesias.   Psychiatric/Behavioral: Negative for mood changes. The patient is not nervous/anxious.         OBJECTIVE:   /69 (BP Location: Right arm, Patient Position: Sitting, Cuff Size: Adult Large)   Pulse 59   Temp 97.6  F (36.4  C) (Tympanic)   Resp 20   Ht 1.7 m (5' 6.93\")   Wt 75.3 kg (166 lb)   LMP 05/10/2021 (Approximate)   SpO2 100%   BMI 26.05 kg/m    Physical Exam  Constitutional:       Appearance: Normal appearance.   HENT:      Right Ear: Tympanic membrane normal.      Left Ear: Tympanic membrane normal.      Mouth/Throat:      Mouth: Mucous membranes are moist.   Eyes:      Conjunctiva/sclera: Conjunctivae normal.   Cardiovascular:      Rate and Rhythm: Normal rate and regular rhythm.      Pulses: Normal pulses.   Pulmonary:      Effort: Pulmonary effort is normal.      Breath sounds: Normal breath sounds.   Abdominal:      General: Bowel sounds are normal.   Genitourinary:     General: Normal vulva.      Vagina: No vaginal discharge.   Musculoskeletal:      Right lower leg: No edema.      Left lower leg: No edema.   Neurological:      Mental Status: She is alert.   Psychiatric:         Thought Content: Thought " content normal.         Judgment: Judgment normal.         ASSESSMENT/PLAN:   Adrianne was seen today for physical.    Diagnoses and all orders for this visit:    Routine general medical examination at a health care facility  Congratulated on increasing exercise.  Reviewed vaccines that need updating  -     Basic metabolic panel  (Ca, Cl, CO2, Creat, Gluc, K, Na, BUN); Future  -     CBC with platelets and differential; Future  -     TSH with free T4 reflex; Future    Cervical cancer screening  -     Pap Screen with HPV - recommended age 30 - 65 years    Screen for colon cancer  -     Colonoscopy Screening  Referral; Future    Fibromuscular dysplasia (H)  Episode of transient global amnesia, recommended meeting with vascular surgery to discuss if any further recommendations/routine monitoring  -     Vascular Surgery Referral; Future    Screening for hyperlipidemia  -     Lipid panel reflex to direct LDL Non-fasting; Future    Other orders  -     TDAP VACCINE (Adacel, Boostrix)        Patient has been advised of split billing requirements and indicates understanding: Yes      COUNSELING:  Reviewed preventive health counseling, as reflected in patient instructions  Special attention given to:        Regular exercise       Healthy diet/nutrition       Vision screening       Hearing screening       Osteoporosis prevention/bone health       Colorectal Cancer Screening        She reports that she has never smoked. She does not have any smokeless tobacco history on file.      DOMINGA TIRADO DO  [facility]

## 2023-06-28 LAB
BKR LAB AP GYN ADEQUACY: NORMAL
BKR LAB AP GYN INTERPRETATION: NORMAL
BKR LAB AP HPV REFLEX: NORMAL
BKR LAB AP PREVIOUS ABNL DX: NORMAL
BKR LAB AP PREVIOUS ABNORMAL: NORMAL
PATH REPORT.COMMENTS IMP SPEC: NORMAL
PATH REPORT.COMMENTS IMP SPEC: NORMAL
PATH REPORT.RELEVANT HX SPEC: NORMAL

## 2023-06-30 LAB
HUMAN PAPILLOMA VIRUS 16 DNA: NEGATIVE
HUMAN PAPILLOMA VIRUS 18 DNA: NEGATIVE
HUMAN PAPILLOMA VIRUS FINAL DIAGNOSIS: NORMAL
HUMAN PAPILLOMA VIRUS OTHER HR: NEGATIVE

## 2023-07-03 ENCOUNTER — TELEPHONE (OUTPATIENT)
Dept: SURGERY | Facility: CLINIC | Age: 63
End: 2023-07-03
Payer: COMMERCIAL

## 2023-07-03 NOTE — TELEPHONE ENCOUNTER
Screening Questions  BLUE  KIND OF PREP RED  LOCATION [review exclusion criteria] GREEN  SEDATION TYPE        y Are you active on mychart?       [place] Ordering/Referring Provider?        [facility] What type of coverage do you have?      n Have you had a positive covid test in the last 14 days?     26.05 1. BMI  [BMI 40+ - review exclusion criteria& smart-phrase document]    y  2. Are you able to give consent for your medical care? [IF NO,RN REVIEW]          n  3. Are you taking any prescription pain medications on a routine schedule   (ex narcotics: oxycodone, roxicodone, oxycontin,  and percocet)? [RN Review]        n  3a. EXTENDED PREP What kind of prescription?     n 4. Do you have any chemical dependencies such as alcohol, street drugs, or methadone?        **If yes 3- 5 , please schedule with MAC sedation.**          IF YES TO ANY 6 - 10 - HOSPITAL SETTING ONLY.     n 6.   Do you need assistance transferring?     n 7.   Have you had a heart or lung transplant?    n 8.   Are you currently on dialysis?   n 9.   Do you use daily home oxygen?   n 10. Do you take nitroglycerin?   10a. n If yes, how often?     n 11. Are you currently pregnant?    11a. n If yes, how many weeks? [ Greater than 12 weeks, OR NEEDED]    n 12. Do you have Pulmonary Hypertension? *NEED PAC APPT AT [facility] w/ MAC*     n 13. [review exclusion criteria]  Do you have any implantable devices in your body (pacemaker, defib, LVAD)?    n 14. In the past 6 months, have you had any heart related issues including cardiomyopathy or heart attack?     14a. n If yes, did it require cardiac stenting if so when?     n 15. Have you had a stroke or Transient ischemic attack (TIA - aka  mini stroke ) within 6 months?      n 16. Do you have mod to severe Obstructive Sleep Apnea?  [Hospital only]    n 17. Do you have SEVERE AND UNCONTROLLED asthma? *NEED PAC APPT AT [facility] w/MAC*     18.Do you take blood thinners?  No    n 19. Do you take any of the following  "medications?    nPhentermine    nOzempic    nWegovy (Semaglutide)      19a. If yes, \"Hold for 7 days before procedure.  Please consult your prescribing provider if you have questions about holding this medication.\"     n  20. Do you have chronic kidney disease?      n  21. Do you have a diagnosis of diabetes?     n  22. On a regular basis do you go 3-5 days between bowel movements?     y 23. Preferred LOCAL Pharmacy for Pre Prescription         [facility] - [facility] [address]        - CLOSING REMINDERS -    You will receive a call from a Nurse to review instructions and health history.  This assessment must be completed prior to your procedure.  Failure to complete the Nurse assessment may result in the procedure being cancelled.      On the day of your procedure, please designatean adult(s) who can drive you home stay with you for the next 24 hours. The medicines used in the exam will make you sleepy. You will not be able to drive.      You cannot take public transportation, ride share services, or non-medical taxi service without a responsible caregiver.  Medical transport services are allowed with the requirement that a responsible caregiver will receive you at your destination.  We require that drivers and caregivers are confirmed prior to your procedure.      - SCHEDULING DETAILS -  n & n Hospital Setting Required & If yes, what is the exclusion?   n  Surgeon    9/12/23  Date of Procedure  Lower Endoscopy [Colonoscopy]  Type of Procedure Scheduled  [facility]- If you answer yes to questions #8, #20, #21 [  pts ]Which Colonoscopy Prep was Sent?     general Sedation Type     n PAC / Pre-op Required       "

## 2023-07-13 ENCOUNTER — OFFICE VISIT (OUTPATIENT)
Dept: OTHER | Facility: CLINIC | Age: 63
End: 2023-07-13
Attending: INTERNAL MEDICINE
Payer: COMMERCIAL

## 2023-07-13 VITALS
SYSTOLIC BLOOD PRESSURE: 120 MMHG | WEIGHT: 165.8 LBS | HEIGHT: 67 IN | OXYGEN SATURATION: 99 % | DIASTOLIC BLOOD PRESSURE: 76 MMHG | HEART RATE: 75 BPM | BODY MASS INDEX: 26.02 KG/M2

## 2023-07-13 DIAGNOSIS — I77.3 FIBROMUSCULAR DYSPLASIA (H): Primary | ICD-10-CM

## 2023-07-13 DIAGNOSIS — E78.5 HYPERLIPIDEMIA LDL GOAL <70: ICD-10-CM

## 2023-07-13 PROCEDURE — 99205 OFFICE O/P NEW HI 60 MIN: CPT | Performed by: INTERNAL MEDICINE

## 2023-07-13 PROCEDURE — G0463 HOSPITAL OUTPT CLINIC VISIT: HCPCS

## 2023-07-13 NOTE — PROGRESS NOTES
[facility] INITIAL VASCULAR MEDICINE CONSULT    ( New Patient visit)     PRIMARY HEALTH CARE PROVIDER:  Ama Palmer DO      REFERRING HEALTH CARE PROVIDER;  Ama Palmer DO      REASON FOR CONSULT: Evaluation and management of fibromuscular dysplasia of carotid arteries      HPI: Adrianne Phan is a 63 year old very pleasant female non-smoker, nondiabetic and normotensive underwent head and neck CTA in 2021 as a part of the work-up for short-term memory loss and headache and confusion.   This revealed FMD features of bilateral carotid arteries.  She has no history of chiropractic neck manipulations or deep neck massage etc. no history of motor vehicle accident.  She has no family history of FMD or aortopathy  She is normotensive.  In 2019 she underwent CT abdomen pelvis for abdominal pain work-up did not show any FMD features in renal arteries or mesenteric arteries.  She has hyperlipidemia and not on any medications and she is postmenopausal working on the diet.    She is new to me reviewed available records in the epic and updated chart      PAST MEDICAL HISTORY  Past Medical History:   Diagnosis Date     ASCUS favor benign 05/01/2015    Neg HPV - Plan cotest in 3 years     Fibromuscular dysplasia of both carotid arteries (H)      PAP SMEAR OF CERVIX W ASCUS 04/13/2006 4/13/2006 Ascus x 2.  Hpv- x 2.      PONV (postoperative nausea and vomiting)        CURRENT MEDICATIONS  NO ACTIVE MEDICATIONS, . (Patient not taking: Reported on 7/13/2023)    No current facility-administered medications on file prior to visit.      PAST SURGICAL HISTORY:  Past Surgical History:   Procedure Laterality Date     COLONOSCOPY  10/26/2012    Procedure: COLONOSCOPY;  colonoscopy;  Surgeon: Nai Bonilla MD;  Location: [facility]     LAPAROSCOPIC APPENDECTOMY N/A 6/4/2019    Procedure: APPENDECTOMY, LAPAROSCOPIC;  Surgeon: Arthur Blair MD;  Location: [facility]     SURGICAL HISTORY OF -    [id number]    bilateral tubal ligation       ALLERGIES     Allergies   Allergen Reactions     Nkda [No Known Drug Allergy]        FAMILY HISTORY  Family History   Problem Relation Age of Onset     Thyroid Disease Mother      Hypertension Mother      Musculoskeletal Disorder Mother      Hypertension Father      Musculoskeletal Disorder Sister      Eye Disorder Brother      Neurologic Disorder Brother      Musculoskeletal Disorder Brother      Colon Cancer Maternal Aunt 70     Family History Negative No family hx of        VASCULAR FAMILY HISTORY  1st order relative with atherosclerotic PAD: No  1st order relative with AAA: No  Family history of Familial Hyperlipidemia No  Family History of Hypercoagulable state:No    VASCULAR RISK FACTORS  1. Diabetes:No   2. Smoking: has never smoked.  3. HTN: normotensive  4.Hyperlipidemia: Yes - uncontrolled      SOCIAL HISTORY  Social History     Socioeconomic History     Marital status:      Spouse name: Not on file     Number of children: 2     Years of education: Not on file     Highest education level: Not on file   Occupational History     Not on file   Tobacco Use     Smoking status: Never     Smokeless tobacco: Not on file   Substance and Sexual Activity     Alcohol use: No     Drug use: No     Sexual activity: Yes     Partners: Male     Birth control/protection: Surgical   Other Topics Concern     Parent/sibling w/ CABG, MI or angioplasty before 65F 55M? 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  Normal energy.  No fever or  "chills  Eyes: Negative for vision changes or eye problems  ENT: No problems with ears, nose or throat.  No difficulty swallowing.  Resp: No coughing, wheezing or shortness of breath  CV: No chest pains or palpitations  GI: No nausea, vomiting,  heartburn, abdominal pain, diarrhea, constipation or change in bowel habits  : No urinary frequency or dysuria, bladder or kidney problems  Musculoskeletal: No significant muscle or joint pains  Neurologic: No headaches, numbness, tingling, weakness, problems with balance or coordination  Psychiatric: No problems with anxiety, depression or mental health  Heme/immune/allergy: No history of bleeding or clotting problems or anemia.  No allergies or immune system problems  Endocrine: No history of thyroid disease, diabetes or other endocrine disorders  Skin: No rashes,worrisome lesions or skin problems  Vascular:  No claudication, lifestyle limiting or otherwise; no ischemic rest pain; no non-healing ulcers. No weakness, No loss of sensation     EXAM:  /76 (BP Location: Left arm, Patient Position: Chair, Cuff Size: Adult Regular)   Pulse 75   Ht 5' 7\" (1.702 m)   Wt 165 lb 12.8 oz (75.2 kg)   LMP 05/10/2021 (Approximate)   SpO2 99%   BMI 25.97 kg/m    In general, the patient is a pleasant female in no apparent distress.    HEENT: NC/AT.  PERRLA.  EOMI.  Sclerae white, not injected.  Nares clear.  Pharynx without erythema or exudate.  Dentition intact.    Neck: No adenopathy.  No thyromegaly. Carotids +2/2 bilaterally without bruits.  No jugular venous distension.   Heart: RRR. Normal S1, S2 splits physiologically. No murmur, rub, click, or gallop. The PMI is in the 5th ICS in the midclavicular line. There is no heave.    Lungs: CTA.  No ronchi, wheezes, rales.  No dullness to percussion.   Abdomen: Soft, nontender, nondistended. No organomegaly. No AAA.  No bruits.   Extremities: Vascular;   no clubbing, cyanosis, or edema.  No wounds.  Good palpable peripheral " pulses in both upper and lower extremities    Labs:  LIPID RESULTS:  Lab Results   Component Value Date    CHOL 226 (H) 06/23/2023    CHOL 149 01/30/2003    HDL 57 06/23/2023    HDL 56 01/30/2003     (H) 06/23/2023    LDL 82 01/30/2003    TRIG 124 06/23/2023    TRIG 57 01/30/2003    CHOLHDLRATIO 2.67 01/30/2003       LIVER ENZYME RESULTS:  Lab Results   Component Value Date    AST 24 06/04/2019    ALT 28 06/04/2019       CBC RESULTS:  Lab Results   Component Value Date    WBC 4.5 06/23/2023    WBC 10.0 06/04/2019    RBC 4.56 06/23/2023    RBC 4.76 06/04/2019    HGB 13.4 06/23/2023    HGB 13.9 06/04/2019    HCT 42.1 06/23/2023    HCT 44.0 06/04/2019    MCV 92 06/23/2023    MCV 92 06/04/2019    MCH 29.4 06/23/2023    MCH 29.2 06/04/2019    MCHC 31.8 06/23/2023    MCHC 31.6 06/04/2019    RDW 12.9 06/23/2023    RDW 12.2 06/04/2019     06/23/2023     06/04/2019       BMP RESULTS:  Lab Results   Component Value Date     06/23/2023     06/04/2019    POTASSIUM 3.8 06/23/2023    POTASSIUM 3.6 10/02/2021    POTASSIUM 3.6 06/04/2019    CHLORIDE 104 06/23/2023    CHLORIDE 110 (H) 10/02/2021    CHLORIDE 107 06/04/2019    CO2 29 06/23/2023    CO2 25 10/02/2021    CO2 27 06/04/2019    ANIONGAP 9 06/23/2023    ANIONGAP 6 10/02/2021    ANIONGAP 8 06/04/2019     (H) 06/23/2023     (H) 10/02/2021     (H) 10/02/2021     (H) 06/04/2019    BUN 23.2 (H) 06/23/2023    BUN 17 10/02/2021    BUN 13 06/04/2019    CR 1.00 (H) 06/23/2023    CR 0.81 06/04/2019    GFRESTIMATED 63 06/23/2023    GFRESTIMATED 80 06/04/2019    GFRESTBLACK >90 06/04/2019    KASSIDY 9.9 06/23/2023    KASSIDY 9.6 06/04/2019        A1C RESULTS:  No results found for: A1C    THYROID RESULTS:  Lab Results   Component Value Date    TSH 2.03 06/23/2023       Procedures:   CTA  HEAD NECK WITH CONTRAST 10/2/2021 12:38 PM      HISTORY: Code Stroke to evaluate for potential thrombolysis and  thrombectomy. Acute onset of memory  loss.     TECHNIQUE: Axial images were obtained through the head and neck with  intravenous contrast. 70 mL of gadolinium given. Multiplanar  reconstructions were performed. 3-D reconstructions of remote  workstation for CT angiography were also acquired. Radiation dose for  this scan was reduced using automated exposure control, adjustment of  the mA and/or kV according to patient size, or iterative  reconstruction technique.. Carotid stenosis were evaluated by  comparing the caliber of the proximal internal carotid artery to the  caliber of the distal internal carotid artery.     COMPARISON: None.     FINDINGS:     Brachiocephalic vessels: Normal.     Right carotid system: Carotid bifurcations are widely patent. There is  some mild narrowing of the right internal carotid artery with some  beading. FINDINGS are consistent with some fibromuscular dysplasia.  There is no evidence for any acute dissection.     Left carotid system: There is some evidence of some fibromuscular  dysplasia in the cervical internal carotid artery. Carotid bifurcation  is widely patent. There is no evidence for dissection.     Right vertebral artery: Normal nondominant vessel.     Left vertebral artery: Normal.     [place] of [place]: Normal. 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 This is an anatomic variant.     Other findings: None.                                                                      IMPRESSION:  1. 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  2. No evidence for significant stenosis, dissection, thromboembolism,  or aneurysm.     JENNIFER OSHEA MD          Assessment and Plan:     1. Fibromuscular dysplasia (H) carotid arteries CTA 10/2021     2. Hyperlipidemia LDL goal <70    This is a very pleasant 63-year-old female postmenopausal normotensive, nondiabetic, non-smoker and no past medical history developed  confusion, short-term memory loss seen in the ER evaluated by neurology underwent brain MRI and also CT of head and neck in 2021 which revealed bilateral carotid FMD features.  She is totally asymptomatic.  She is here for further evaluation.  She also underwent CT scan of the abdomen pelvis in 2019 which I personally reviewed no FMD features in the renal or mesenteric vessels.  She denies any chest pain, shortness of breath or palpitations  LDL is around 140 not on any medications    Reviewed all the imaging studies with the patient    At present my recommendations,    We will arrange CTA head and neck if it shows similar or worsening features of FMD will consider getting the CT of abdomen pelvis to rule out renal and mesenteric vessels FMD    Offered lipid-lowering medication to the patient and she wanted to modify diet and decide in the future    Suggested taking baby aspirin daily with food    Avoid chiropractic neck manipulations, sudden jerky neck movements, deep neck massage etc    Educated on FMD and its long-term implications    Office visit after CTA of head and neck       60 minutes spent on the date of the encounter doing chart review, history and exam, documentation, and further activities as noted above.  She is new to this clinic reviewed available records and imaging studies in the epic and updated chart AVS with written instructions given.    This note was dictated by utilizing Dragon software    Thank you for the consultation  Copy of this note to primary care physician    Marlene Wells MD, SHERLY, LLUVIA, JAKE, FS  Vascular Medicine  Clinical Hypertension specialist  Clinical Lipidologist

## 2023-07-13 NOTE — PROGRESS NOTES
"Patient is here to discuss consult    /77 (BP Location: Right arm, Patient Position: Chair, Cuff Size: Adult Regular)   Pulse 74   Ht 5' 7\" (1.702 m)   Wt 165 lb 12.8 oz (75.2 kg)   LMP 05/10/2021 (Approximate)   SpO2 99%   BMI 25.97 kg/m      Questions patient would like addressed today are: N/A.    Refills are needed: No    Has homecare services and agency name:  [facility]"

## 2023-07-13 NOTE — PATIENT INSTRUCTIONS
Go for CTA head and neck we will arrange test sooner     Take Baby aspirin daily  with food     Avoid any sudden jerky neck movements , avoid chiropractic neck manipulations etc     Visit one week  after CTA

## 2023-07-14 ENCOUNTER — TELEPHONE (OUTPATIENT)
Dept: OTHER | Facility: CLINIC | Age: 63
End: 2023-07-14
Payer: COMMERCIAL

## 2023-07-14 NOTE — TELEPHONE ENCOUNTER
Future Appointments   Date Time Provider Department Center   8/1/2023  4:20 PM [facility]   8/10/2023  3:30 PM Marlene Wells MD [facility]

## 2023-07-14 NOTE — TELEPHONE ENCOUNTER
Follow-up to 7/13/23      CTA head/neck with contrast    Follow up one week later - may be virtual or in-clinic.            Latest Reference Range & Units Most Recent   Creatinine 0.51 - 0.95 mg/dL 1.00 (H)  6/23/23 15:58   GFR Estimate >60 mL/min/1.73m2 63  6/23/23 15:58   No contrast allergy per chart review.

## 2023-08-01 ENCOUNTER — HOSPITAL ENCOUNTER (OUTPATIENT)
Dept: CT IMAGING | Facility: CLINIC | Age: 63
Discharge: HOME | End: 2023-08-01
Attending: INTERNAL MEDICINE | Admitting: INTERNAL MEDICINE
Payer: COMMERCIAL

## 2023-08-01 DIAGNOSIS — I77.3 FIBROMUSCULAR DYSPLASIA (H): ICD-10-CM

## 2023-08-01 PROCEDURE — 250N000011 HC RX IP 250 OP 636: Performed by: INTERNAL MEDICINE

## 2023-08-01 PROCEDURE — 250N000009 HC RX 250: Performed by: INTERNAL MEDICINE

## 2023-08-01 PROCEDURE — 70496 CT ANGIOGRAPHY HEAD: CPT

## 2023-08-01 PROCEDURE — 70498 CT ANGIOGRAPHY NECK: CPT

## 2023-08-01 RX ORDER — IOPAMIDOL 755 MG/ML
75 INJECTION, SOLUTION INTRAVASCULAR ONCE
Status: COMPLETED | OUTPATIENT
Start: 2023-08-01 | End: 2023-08-01

## 2023-08-01 RX ADMIN — IOPAMIDOL 75 ML: 755 INJECTION, SOLUTION INTRAVENOUS at 16:17

## 2023-08-01 RX ADMIN — SODIUM CHLORIDE 100 ML: 9 INJECTION, SOLUTION INTRAVENOUS at 16:18

## 2023-08-10 ENCOUNTER — OFFICE VISIT (OUTPATIENT)
Dept: OTHER | Facility: CLINIC | Age: 63
End: 2023-08-10
Attending: INTERNAL MEDICINE
Payer: COMMERCIAL

## 2023-08-10 VITALS
SYSTOLIC BLOOD PRESSURE: 130 MMHG | OXYGEN SATURATION: 98 % | WEIGHT: 166 LBS | BODY MASS INDEX: 26.06 KG/M2 | HEIGHT: 67 IN | DIASTOLIC BLOOD PRESSURE: 78 MMHG | HEART RATE: 72 BPM

## 2023-08-10 DIAGNOSIS — E78.5 HYPERLIPIDEMIA LDL GOAL <70: ICD-10-CM

## 2023-08-10 DIAGNOSIS — I77.3 FIBROMUSCULAR DYSPLASIA (H): Primary | ICD-10-CM

## 2023-08-10 PROCEDURE — G0463 HOSPITAL OUTPT CLINIC VISIT: HCPCS

## 2023-08-10 PROCEDURE — 99215 OFFICE O/P EST HI 40 MIN: CPT | Performed by: INTERNAL MEDICINE

## 2023-08-10 NOTE — PROGRESS NOTES
[facility]  VASCULAR MEDICINE FOLLOW UP VISIT      PRIMARY HEALTH CARE PROVIDER:  Ama Palmer DO      REASON FOR VISIT:  Follow up visit   Review of CTA of head and neck   Hx of of fibromuscular dysplasia of carotid arteries  HLP      HPI: Adrianne Phan is a 63 year old very pleasant female non-smoker, nondiabetic and normotensive underwent head and neck CTA in 2021 as a part of the work-up for short-term memory loss and headache and confusion.   This revealed FMD features of bilateral carotid arteries.  She has no history of chiropractic neck manipulations or deep neck massage etc. no history of motor vehicle accident.  She has no family history of FMD or aortopathy  She is normotensive.  In 2019 she underwent CT abdomen pelvis for abdominal pain work-up did not show any FMD features in renal arteries or mesenteric arteries.  She has hyperlipidemia and not on any medications and she is postmenopausal working on the diet.      PAST MEDICAL HISTORY  Past Medical History:   Diagnosis Date    ASCUS favor benign 05/01/2015    Neg HPV - Plan cotest in 3 years    Fibromuscular dysplasia of both carotid arteries (H)     PAP SMEAR OF CERVIX W ASCUS 04/13/2006 4/13/2006 Ascus x 2.  Hpv- x 2.     PONV (postoperative nausea and vomiting)        CURRENT MEDICATIONS  NO ACTIVE MEDICATIONS, . (Patient not taking: Reported on 7/13/2023)    No current facility-administered medications on file prior to visit.      PAST SURGICAL HISTORY:  Past Surgical History:   Procedure Laterality Date    COLONOSCOPY  10/26/2012    Procedure: COLONOSCOPY;  colonoscopy;  Surgeon: Nai Bonilla MD;  Location: [facility]    LAPAROSCOPIC APPENDECTOMY N/A 6/4/2019    Procedure: APPENDECTOMY, LAPAROSCOPIC;  Surgeon: Arthur Blair MD;  Location: [facility]    SURGICAL HISTORY OF -   [id number]    bilateral tubal ligation       ALLERGIES     Allergies   Allergen Reactions    Nkda [No Known Drug Allergy]         FAMILY HISTORY  Family History   Problem Relation Age of Onset    Thyroid Disease Mother     Hypertension Mother     Musculoskeletal Disorder Mother     Hypertension Father     Musculoskeletal Disorder Sister     Eye Disorder Brother     Neurologic Disorder Brother     Musculoskeletal Disorder Brother     Colon Cancer Maternal Aunt 70    Family History Negative No family hx of        VASCULAR FAMILY HISTORY  1st order relative with atherosclerotic PAD: No  1st order relative with AAA: No  Family history of Familial Hyperlipidemia No  Family History of Hypercoagulable state:No    VASCULAR RISK FACTORS  1. Diabetes:No   2. Smoking: has never smoked.  3. HTN: normotensive  4.Hyperlipidemia: Yes - uncontrolled      SOCIAL HISTORY  Social History     Socioeconomic History    Marital status:      Spouse name: Not on file    Number of children: 2    Years of education: Not on file    Highest education level: Not on file   Occupational History    Not on file   Tobacco Use    Smoking status: Never    Smokeless tobacco: Not on file   Substance and Sexual Activity    Alcohol use: No    Drug use: No    Sexual activity: Yes     Partners: Male     Birth control/protection: Surgical   Other Topics Concern    Parent/sibling w/ CABG, MI or angioplasty before 65F 55M? 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  Normal energy.  No fever or chills  Eyes: Negative for vision changes or eye problems  ENT: No problems with ears, nose or throat.  No difficulty swallowing.  Resp: No coughing,  "wheezing or shortness of breath  CV: No chest pains or palpitations  GI: No nausea, vomiting,  heartburn, abdominal pain, diarrhea, constipation or change in bowel habits  : No urinary frequency or dysuria, bladder or kidney problems  Musculoskeletal: No significant muscle or joint pains  Neurologic: No headaches, numbness, tingling, weakness, problems with balance or coordination  Psychiatric: No problems with anxiety, depression or mental health  Heme/immune/allergy: No history of bleeding or clotting problems or anemia.  No allergies or immune system problems  Endocrine: No history of thyroid disease, diabetes or other endocrine disorders  Skin: No rashes,worrisome lesions or skin problems  Vascular:  No claudication, lifestyle limiting or otherwise; no ischemic rest pain; no non-healing ulcers. No weakness, No loss of sensation     EXAM:  /78 (BP Location: Right arm, Patient Position: Chair, Cuff Size: Adult Regular)   Pulse 72   Ht 5' 7\" (1.702 m)   Wt 166 lb (75.3 kg)   LMP 05/10/2021 (Approximate)   SpO2 98%   BMI 26.00 kg/m    In general, the patient is a pleasant female in no apparent distress.    HEENT: NC/AT.  PERRLA.  EOMI.  Sclerae white, not injected.  Nares clear.  Pharynx without erythema or exudate.  Dentition intact.    Neck: No adenopathy.  No thyromegaly. Carotids +2/2 bilaterally without bruits.  No jugular venous distension.   Heart: RRR. Normal S1, S2 splits physiologically. No murmur, rub, click, or gallop. The PMI is in the 5th ICS in the midclavicular line. There is no heave.    Lungs: CTA.  No ronchi, wheezes, rales.  No dullness to percussion.   Abdomen: Soft, nontender, nondistended. No organomegaly. No AAA.  No bruits.   Extremities: Vascular;   no clubbing, cyanosis, or edema.  No wounds.  Good palpable peripheral pulses in both upper and lower extremities    Labs:  LIPID RESULTS:  Lab Results   Component Value Date    CHOL 226 (H) 06/23/2023    CHOL 149 01/30/2003    HDL " 57 06/23/2023    HDL 56 01/30/2003     (H) 06/23/2023    LDL 82 01/30/2003    TRIG 124 06/23/2023    TRIG 57 01/30/2003    CHOLHDLRATIO 2.67 01/30/2003       LIVER ENZYME RESULTS:  Lab Results   Component Value Date    AST 24 06/04/2019    ALT 28 06/04/2019       CBC RESULTS:  Lab Results   Component Value Date    WBC 4.5 06/23/2023    WBC 10.0 06/04/2019    RBC 4.56 06/23/2023    RBC 4.76 06/04/2019    HGB 13.4 06/23/2023    HGB 13.9 06/04/2019    HCT 42.1 06/23/2023    HCT 44.0 06/04/2019    MCV 92 06/23/2023    MCV 92 06/04/2019    MCH 29.4 06/23/2023    MCH 29.2 06/04/2019    MCHC 31.8 06/23/2023    MCHC 31.6 06/04/2019    RDW 12.9 06/23/2023    RDW 12.2 06/04/2019     06/23/2023     06/04/2019       BMP RESULTS:  Lab Results   Component Value Date     06/23/2023     06/04/2019    POTASSIUM 3.8 06/23/2023    POTASSIUM 3.6 10/02/2021    POTASSIUM 3.6 06/04/2019    CHLORIDE 104 06/23/2023    CHLORIDE 110 (H) 10/02/2021    CHLORIDE 107 06/04/2019    CO2 29 06/23/2023    CO2 25 10/02/2021    CO2 27 06/04/2019    ANIONGAP 9 06/23/2023    ANIONGAP 6 10/02/2021    ANIONGAP 8 06/04/2019     (H) 06/23/2023     (H) 10/02/2021     (H) 10/02/2021     (H) 06/04/2019    BUN 23.2 (H) 06/23/2023    BUN 17 10/02/2021    BUN 13 06/04/2019    CR 1.00 (H) 06/23/2023    CR 0.81 06/04/2019    GFRESTIMATED 63 06/23/2023    GFRESTIMATED 80 06/04/2019    GFRESTBLACK >90 06/04/2019    KASSIDY 9.9 06/23/2023    KASSIDY 9.6 06/04/2019        A1C RESULTS:  No results found for: A1C    THYROID RESULTS:  Lab Results   Component Value Date    TSH 2.03 06/23/2023       Procedures:   CTA  HEAD NECK WITH CONTRAST 10/2/2021 12:38 PM      HISTORY: Code Stroke to evaluate for potential thrombolysis and  thrombectomy. Acute onset of memory loss.     TECHNIQUE: Axial images were obtained through the head and neck with  intravenous contrast. 70 mL of gadolinium given. Multiplanar  reconstructions  were performed. 3-D reconstructions of remote  workstation for CT angiography were also acquired. Radiation dose for  this scan was reduced using automated exposure control, adjustment of  the mA and/or kV according to patient size, or iterative  reconstruction technique.. Carotid stenosis were evaluated by  comparing the caliber of the proximal internal carotid artery to the  caliber of the distal internal carotid artery.     COMPARISON: None.     FINDINGS:     Brachiocephalic vessels: Normal.     Right carotid system: Carotid bifurcations are widely patent. There is  some mild narrowing of the right internal carotid artery with some  beading. FINDINGS are consistent with some fibromuscular dysplasia.  There is no evidence for any acute dissection.     Left carotid system: There is some evidence of some fibromuscular  dysplasia in the cervical internal carotid artery. Carotid bifurcation  is widely patent. There is no evidence for dissection.     Right vertebral artery: Normal nondominant vessel.     Left vertebral artery: Normal.     [place]: Normal. 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 This is an anatomic variant.     Other findings: None.                                                                      IMPRESSION:  1. 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  2. No evidence for significant stenosis, dissection, thromboembolism,  or aneurysm.     JENNIFER OSHEA MD      EXAM: CTA HEAD NECK W CONTRAST  LOCATION: [facility]  DATE: 8/1/2023     INDICATION: Headache; Neurologic deficit, non traumatic; Altered mental status; Neurologic deficit onset <= 24 hours; No known automatically detected potential contraindications to iodinated contrast  COMPARISON: None.  CONTRAST: 75 mL Isovue 370  TECHNIQUE: Head and neck CT angiogram with IV contrast. Axial helical CT  images of the head and neck vessels obtained during the arterial phase of intravenous contrast administration. Axial 2D reconstructed images and multiplanar 3D MIP reconstructed   images of the head and neck vessels were performed by the technologist. Dose reduction techniques were used. All stenosis measurements made according to NASCET criteria unless otherwise specified.     FINDINGS:      HEAD CTA:  ANTERIOR CIRCULATION: No stenosis/occlusion or high flow vascular malformation. Hypoplastic right A1 segment, anatomic variant. Tiny conically-shaped inferiorly directed outpouching arising from the expected location of the right posterior communicating   artery origin has a tiny vessel emanating from its tip and is most consistent with an infundibulum (series 9/images 29-30).     POSTERIOR CIRCULATION: No stenosis/occlusion, aneurysm, or high flow vascular malformation.       DURAL VENOUS SINUSES: Expected enhancement of the major dural venous sinuses.     NECK CTA:  RIGHT CAROTID: No measurable stenosis or dissection. Undulating contour irregularity along the length of the internal carotid artery.     LEFT CAROTID: No measurable stenosis or dissection. Undulating contour irregularity along the length of the internal carotid artery.     VERTEBRAL ARTERIES: No focal stenosis or dissection. Left dominant. A few areas of mild luminal narrowing/irregularity involving the left vertebral artery V2 segment secondary to extrinsic compression from cervical spine degenerative changes. Nondominant   right vertebral artery is diminutive in caliber along its entire length.     AORTIC ARCH: Classic aortic arch anatomy with no significant stenosis at the origin of the great vessels.     NONVASCULAR STRUCTURES: Mucus retention cyst in the right maxillary sinus. Multilevel cervical spondylosis without high-grade spinal canal stenosis.                                                                      IMPRESSION:      HEAD CTA:    1.  No high-grade stenosis or occlusion involving the major intracranial arteries.  2.  Tiny conically-shaped inferiorly directed outpouching arising from the expected location of the right posterior communicating artery origin has a tiny vessel emanating from its tip and is most consistent with an infundibulum.     NECK CTA:  1.  No high-grade stenosis or occlusion involving the major arteries of the neck.  2.  Abnormal undulating contour irregularity of the bilateral cervical internal carotid arteries, favored to represent fibromuscular dysplasia.  3.  No evidence of dissection.      Assessment and Plan:     1. Fibromuscular dysplasia (H) carotid arteries CTA 10/2021     2. Hyperlipidemia LDL goal <70    This is a very pleasant 63-year-old female postmenopausal normotensive, nondiabetic, non-smoker and no past medical history developed confusion, short-term memory loss seen in the ER evaluated by neurology underwent brain MRI and also CT of head and neck in 2021 which revealed bilateral carotid FMD features.  She is totally asymptomatic.   She also underwent CT scan of the abdomen pelvis in 2019 which I personally reviewed no FMD features in the renal or mesenteric vessels.  She denies any chest pain, shortness of breath or palpitations  LDL is around 140 not on any medications  She underwent CTA of head and neck last week FMD changes in carotids stable     Reviewed all the imaging studies with the patient today     At present my recommendations,    Offered lipid-lowering medication to the patient and she wanted to modify diet and decide in the future    Suggested taking baby aspirin daily with food    Avoid chiropractic neck manipulations, sudden jerky neck movements, deep neck massage etc    Educated on FMD and its long-term implications    CTA of head and neck in 1-2 years then visit      40 minutes spent on the date of the encounter doing chart review, history and exam, documentation, and further activities  as noted above.AVS with written instructions given.    This note was dictated by utilizing Dragon software      Copy of this note to primary care physician    Marlene Wells MD, SHERLY, FSVM, FNLA, FSVM  Vascular Medicine  Clinical Hypertension specialist  Clinical Lipidologist

## 2023-08-10 NOTE — PATIENT INSTRUCTIONS
Repeat CTA of head and neck in 1-2 years    If LDL is more than 70 consider statin medication     Monitor BP at home goal is less than 130/80    Take baby aspirin daily with food

## 2023-08-10 NOTE — PROGRESS NOTES
"Patient is here to discuss follow up    /78 (BP Location: Right arm, Patient Position: Chair, Cuff Size: Adult Regular)   Pulse 72   Ht 5' 7\" (1.702 m)   Wt 166 lb (75.3 kg)   LMP 05/10/2021 (Approximate)   SpO2 98%   BMI 26.00 kg/m      Questions patient would like addressed today are: N/A.    Refills are needed: No    Has homecare services and agency name:  [facility]    "

## 2023-09-11 ENCOUNTER — ANESTHESIA EVENT (OUTPATIENT)
Dept: GASTROENTEROLOGY | Facility: CLINIC | Age: 63
End: 2023-09-11
Payer: COMMERCIAL

## 2023-09-11 RX ORDER — SODIUM CHLORIDE, SODIUM LACTATE, POTASSIUM CHLORIDE, CALCIUM CHLORIDE 600; 310; 30; 20 MG/100ML; MG/100ML; MG/100ML; MG/100ML
INJECTION, SOLUTION INTRAVENOUS CONTINUOUS
Status: CANCELLED | OUTPATIENT
Start: 2023-09-11

## 2023-09-11 RX ORDER — ONDANSETRON 4 MG/1
4 TABLET, ORALLY DISINTEGRATING ORAL EVERY 30 MIN PRN
Status: CANCELLED | OUTPATIENT
Start: 2023-09-11

## 2023-09-11 RX ORDER — ONDANSETRON 2 MG/ML
4 INJECTION INTRAMUSCULAR; INTRAVENOUS EVERY 30 MIN PRN
Status: CANCELLED | OUTPATIENT
Start: 2023-09-11

## 2023-09-11 RX ORDER — DEXAMETHASONE SODIUM PHOSPHATE 4 MG/ML
4 INJECTION, SOLUTION INTRA-ARTICULAR; INTRALESIONAL; INTRAMUSCULAR; INTRAVENOUS; SOFT TISSUE
Status: CANCELLED | OUTPATIENT
Start: 2023-09-11

## 2023-09-11 RX ORDER — ALBUTEROL SULFATE 0.83 MG/ML
2.5 SOLUTION RESPIRATORY (INHALATION) EVERY 4 HOURS PRN
Status: CANCELLED | OUTPATIENT
Start: 2023-09-11

## 2023-09-11 NOTE — ANESTHESIA PREPROCEDURE EVALUATION
Anesthesia Pre-Procedure Evaluation    Patient: Adrianne Phan   MRN: [id number] : 1960        Procedure : Procedure(s):  Colonoscopy          Past Medical History:   Diagnosis Date    ASCUS favor benign 2015    Neg HPV - Plan cotest in 3 years    Fibromuscular dysplasia of both carotid arteries (H)     PAP SMEAR OF CERVIX W ASCUS 2006 Ascus x 2.  Hpv- x 2.     PONV (postoperative nausea and vomiting)       Past Surgical History:   Procedure Laterality Date    COLONOSCOPY  10/26/2012    Procedure: COLONOSCOPY;  colonoscopy;  Surgeon: Nai Bonilla MD;  Location: [facility]    LAPAROSCOPIC APPENDECTOMY N/A 2019    Procedure: APPENDECTOMY, LAPAROSCOPIC;  Surgeon: Arthur Blair MD;  Location: [facility]    SURGICAL HISTORY OF -   [id number]    bilateral tubal ligation      Allergies   Allergen Reactions    Nkda [No Known Drug Allergy]       Social History     Tobacco Use    Smoking status: Never    Smokeless tobacco: Not on file   Substance Use Topics    Alcohol use: No      Wt Readings from Last 1 Encounters:   08/10/23 75.3 kg (166 lb)        Anesthesia Evaluation   Pt has had prior anesthetic. Type: MAC and General.    History of anesthetic complications  - PONV.      ROS/MED HX  ENT/Pulmonary:       Neurologic:       Cardiovascular:     (+)  - -   -  - -                                 Previous cardiac testing   Echo: Date: Results:    Stress Test:  Date: Results:    ECG Reviewed:  Date: 10/21 Results:  Sinus  Rhythm   -Poor R-wave progression -may be secondary to pulmonary disease   consider old anterior infarct.    Low voltage -possible pulmonary disease.   Cath:  Date: Results:      METS/Exercise Tolerance:     Hematologic:       Musculoskeletal:       GI/Hepatic:       Renal/Genitourinary:       Endo:       Psychiatric/Substance Use:       Infectious Disease:       Malignancy:       Other:            Physical Exam    Airway        Mallampati: II   TM distance: > 3 FB    Neck ROM: full   Mouth opening: > 3 cm    Respiratory Devices and Support         Dental       (+) Minor Abnormalities - some fillings, tiny chips      Cardiovascular   cardiovascular exam normal       Rhythm and rate: regular and normal     Pulmonary           breath sounds clear to auscultation           OUTSIDE LABS:  CBC:   Lab Results   Component Value Date    WBC 4.5 06/23/2023    WBC 4.6 10/02/2021    HGB 13.4 06/23/2023    HGB 14.8 10/02/2021    HCT 42.1 06/23/2023    HCT 45.1 10/02/2021     06/23/2023     10/02/2021     BMP:   Lab Results   Component Value Date     06/23/2023     10/02/2021    POTASSIUM 3.8 06/23/2023    POTASSIUM 3.6 10/02/2021    CHLORIDE 104 06/23/2023    CHLORIDE 110 (H) 10/02/2021    CO2 29 06/23/2023    CO2 25 10/02/2021    BUN 23.2 (H) 06/23/2023    BUN 17 10/02/2021    CR 1.00 (H) 06/23/2023    CR 0.91 10/02/2021     (H) 06/23/2023     (H) 10/02/2021     COAGS:   Lab Results   Component Value Date    PTT 28 10/02/2021    INR 0.91 10/02/2021     POC: No results found for: BGM, HCG, HCGS  HEPATIC:   Lab Results   Component Value Date    ALBUMIN 4.0 06/04/2019    PROTTOTAL 7.2 06/04/2019    ALT 28 06/04/2019    AST 24 06/04/2019    ALKPHOS 68 06/04/2019    BILITOTAL 0.7 06/04/2019     OTHER:   Lab Results   Component Value Date    LACT 3.1 (H) 06/04/2019    KASSIDY 9.9 06/23/2023    TSH 2.03 06/23/2023       Anesthesia Plan    ASA Status:  2    NPO Status:  NPO Appropriate    Anesthesia Type: General.     - Airway: Native airway   Induction: Intravenous, Propofol.   Maintenance: TIVA.        Consents    Anesthesia Plan(s) and associated risks, benefits, and realistic alternatives discussed. Questions answered and patient/representative(s) expressed understanding.     - Discussed: Risks, Benefits and Alternatives for BOTH SEDATION and the PROCEDURE were discussed     - Discussed with:  Patient      - Extended Intubation/Ventilatory Support  Discussed: No.      - Patient is DNR/DNI Status: No     Use of blood products discussed: No .     Postoperative Care            Comments:                REE Renae CRNA

## 2023-09-12 ENCOUNTER — HOSPITAL ENCOUNTER (OUTPATIENT)
Facility: CLINIC | Age: 63
Discharge: HOME | End: 2023-09-12
Attending: SURGERY | Admitting: SURGERY
Payer: COMMERCIAL

## 2023-09-12 ENCOUNTER — ANESTHESIA (OUTPATIENT)
Dept: GASTROENTEROLOGY | Facility: CLINIC | Age: 63
End: 2023-09-12
Payer: COMMERCIAL

## 2023-09-12 VITALS
SYSTOLIC BLOOD PRESSURE: 124 MMHG | RESPIRATION RATE: 16 BRPM | HEART RATE: 65 BPM | BODY MASS INDEX: 25.27 KG/M2 | TEMPERATURE: 98.2 F | OXYGEN SATURATION: 97 % | DIASTOLIC BLOOD PRESSURE: 61 MMHG | HEIGHT: 67 IN | WEIGHT: 161 LBS

## 2023-09-12 LAB — COLONOSCOPY: NORMAL

## 2023-09-12 PROCEDURE — 258N000003 HC RX IP 258 OP 636: Performed by: NURSE ANESTHETIST, CERTIFIED REGISTERED

## 2023-09-12 PROCEDURE — 250N000009 HC RX 250: Performed by: NURSE ANESTHETIST, CERTIFIED REGISTERED

## 2023-09-12 PROCEDURE — 370N000017 HC ANESTHESIA TECHNICAL FEE, PER MIN: Performed by: SURGERY

## 2023-09-12 PROCEDURE — 88305 TISSUE EXAM BY PATHOLOGIST: CPT | Mod: TC | Performed by: SURGERY

## 2023-09-12 PROCEDURE — 45380 COLONOSCOPY AND BIOPSY: CPT | Mod: PT | Performed by: SURGERY

## 2023-09-12 PROCEDURE — 88305 TISSUE EXAM BY PATHOLOGIST: CPT | Mod: 26 | Performed by: PATHOLOGY

## 2023-09-12 PROCEDURE — 250N000011 HC RX IP 250 OP 636: Performed by: NURSE ANESTHETIST, CERTIFIED REGISTERED

## 2023-09-12 RX ORDER — PROPOFOL 10 MG/ML
INJECTION, EMULSION INTRAVENOUS CONTINUOUS PRN
Status: DISCONTINUED | OUTPATIENT
Start: 2023-09-12 | End: 2023-09-12

## 2023-09-12 RX ORDER — SODIUM CHLORIDE, SODIUM LACTATE, POTASSIUM CHLORIDE, CALCIUM CHLORIDE 600; 310; 30; 20 MG/100ML; MG/100ML; MG/100ML; MG/100ML
INJECTION, SOLUTION INTRAVENOUS CONTINUOUS
Status: DISCONTINUED | OUTPATIENT
Start: 2023-09-12 | End: 2023-09-12 | Stop reason: HOSPADM

## 2023-09-12 RX ORDER — GLYCOPYRROLATE 0.2 MG/ML
INJECTION, SOLUTION INTRAMUSCULAR; INTRAVENOUS PRN
Status: DISCONTINUED | OUTPATIENT
Start: 2023-09-12 | End: 2023-09-12

## 2023-09-12 RX ORDER — LIDOCAINE 40 MG/G
CREAM TOPICAL
Status: DISCONTINUED | OUTPATIENT
Start: 2023-09-12 | End: 2023-09-12 | Stop reason: HOSPADM

## 2023-09-12 RX ORDER — ONDANSETRON 2 MG/ML
INJECTION INTRAMUSCULAR; INTRAVENOUS PRN
Status: DISCONTINUED | OUTPATIENT
Start: 2023-09-12 | End: 2023-09-12

## 2023-09-12 RX ADMIN — LIDOCAINE HYDROCHLORIDE 0.3 ML: 10 INJECTION, SOLUTION EPIDURAL; INFILTRATION; INTRACAUDAL; PERINEURAL at 13:22

## 2023-09-12 RX ADMIN — SODIUM CHLORIDE, POTASSIUM CHLORIDE, SODIUM LACTATE AND CALCIUM CHLORIDE: 600; 310; 30; 20 INJECTION, SOLUTION INTRAVENOUS at 14:31

## 2023-09-12 RX ADMIN — LIDOCAINE HYDROCHLORIDE 100 MG: 10 INJECTION, SOLUTION EPIDURAL; INFILTRATION; INTRACAUDAL; PERINEURAL at 14:32

## 2023-09-12 RX ADMIN — SODIUM CHLORIDE, POTASSIUM CHLORIDE, SODIUM LACTATE AND CALCIUM CHLORIDE: 600; 310; 30; 20 INJECTION, SOLUTION INTRAVENOUS at 13:22

## 2023-09-12 RX ADMIN — PROPOFOL 200 MCG/KG/MIN: 10 INJECTION, EMULSION INTRAVENOUS at 14:32

## 2023-09-12 RX ADMIN — GLYCOPYRROLATE 0.2 MG: 0.2 INJECTION, SOLUTION INTRAMUSCULAR; INTRAVENOUS at 14:41

## 2023-09-12 RX ADMIN — ONDANSETRON 4 MG: 2 INJECTION INTRAMUSCULAR; INTRAVENOUS at 14:49

## 2023-09-12 ASSESSMENT — ACTIVITIES OF DAILY LIVING (ADL)
ADLS_ACUITY_SCORE: 35
ADLS_ACUITY_SCORE: 33

## 2023-09-12 NOTE — H&P
ENDOSCOPY PRE-SEDATION H&P FOR OUTPATIENT PROCEDURES    Adrianne Phan  [id number]  1960    Procedure:   Colonoscopy possible biopsy, possible polypectomy, with MAC sedation.     Pre-procedure diagnosis: screen    Past medical history:   Past Medical History:   Diagnosis Date    ASCUS favor benign 05/01/2015    Neg HPV - Plan cotest in 3 years    Fibromuscular dysplasia of both carotid arteries (H)     PAP SMEAR OF CERVIX W ASCUS 04/13/2006 4/13/2006 Ascus x 2.  Hpv- x 2.     PONV (postoperative nausea and vomiting)        Past surgical history:   Past Surgical History:   Procedure Laterality Date    COLONOSCOPY  10/26/2012    Procedure: COLONOSCOPY;  colonoscopy;  Surgeon: Nai Bonilla MD;  Location: [facility]    LAPAROSCOPIC APPENDECTOMY N/A 6/4/2019    Procedure: APPENDECTOMY, LAPAROSCOPIC;  Surgeon: Arthur Blair MD;  Location: [facility]    SURGICAL HISTORY OF -   [id number]    bilateral tubal ligation       Current Facility-Administered Medications   Medication    lactated ringers infusion    lidocaine (LMX4) kit    lidocaine 1 % 0.1-1 mL    sodium chloride (PF) 0.9% PF flush 3 mL    sodium chloride (PF) 0.9% PF flush 3 mL       Allergies   Allergen Reactions    Nkda [No Known Drug Allergy]        History of Anesthesia/Sedation Problems: no    Physical Exam:    Mental status: alert  Heart: Normal  Lung: Normal  Assessment of patient's airway: Normal  Other as pertinent for procedure: None     ASA Score: See Provation note    Mallampati score:  II - Faucial pillars and soft palate may be seen, but uvula is masked by the base of the tongue    Assessment/Plan:     The patient is an appropriate candidate to receive sedation.    Informed consent was discussed with the patient/family, including the risks, benefits, potential complications and any alternative options associated with sedation.    Patient assessment completed just prior to sedation and while under constant observation by the provider.  Condition determined to be adequate for proceeding with sedation.    The specific risks for the procedure were discussed with the patient at the time of informed consent and include but are not limited to perforation which could require surgery, missing significant neoplasm or lesion, hemorrhage and adverse sedative complication.      Roel Johnson MD

## 2023-09-12 NOTE — LETTER
September 15, 2023      Adrianne CELINA Emilie  [address]  [facility] [address]        Dear MsManjeet,    We are writing to inform you of your test results.    Your pathology report was:  Normal, please follow up by having a repeat colonoscopy in 10 YEARS.    If you do have further questions please don t hesitate to call the below number.      To make an appointment Please call [phone], for  [facility] or  for [facility], to schedule a follow up appointment in 2 weeks.       Resulted Orders   Surgical Pathology Exam   Result Value Ref Range    Case Report       Surgical Pathology Report                         Case: [id number]                                  Authorizing Provider:  Roel Johnson MD Collected:           09/12/2023 02:55 PM          Ordering Location:     [facility]   Received:            09/12/2023 03:12 PM                                 [place]                                                                      Pathologist:           Ajay Gilliland MD PhD                                                      Specimen:    Large Intestine, Colon, at 30cm                                                            Final Diagnosis       A. Colon, polyp at 30 cm, polypectomy:  - Colonic mucosa with no specific histopathologic abnormalities.  (See comment)  - Negative for dysplasia or malignancy.        Comment         - No definitive features to explain a polypoid process is identified, despite examination of numerous deeper levels.  Findings may represent a prominent mucosal fold, early hyperplastic changes or an unsampled submucosal process.      Clinical Information       Procedure:  COLONOSCOPY, WITH POLYPECTOMY AND BIOPSY  Pre-op Diagnosis: Screen for colon cancer [Z12.11]  Post-op Diagnosis: Z12.11 - Screen for colon cancer [ICD-10-CM]      Gross Description       A(1). Large Intestine, Colon, at 30cm:  The specimen is  "received in formalin, labeled with the patient's name, medical record number and other identifying information designated \"colon polyp at 30 cm\". It consists of 2 tan soft tissue fragments, 0.2 and 0.5 cm, admixed with colonic debris.  Entirely submitted in 1 cassette.  (Hortencia Krishnamurthy, Biopsy Tech)      Microscopic Description       Microscopic examination was performed.      Performing Labs       The technical component of this testing was completed at [facility]      Case Images         If you have any questions or concerns, please call the clinic at the number listed above.       Sincerely,      Roel Johnson MD            "

## 2023-09-12 NOTE — PROGRESS NOTES
[facility] DISCHARGE NOTE    Patient discharged to home at 3:30 PM via ambulation. Accompanied by spouse and staff. Discharge instructions reviewed with patient, opportunity offered to ask questions. Prescriptions - None ordered for discharge. All belongings sent with patient.    Sis Dubose RN

## 2023-09-12 NOTE — ANESTHESIA POSTPROCEDURE EVALUATION
Patient: dArianne Phan    Procedure: Procedure(s):  COLONOSCOPY, WITH POLYPECTOMY AND BIOPSY       Anesthesia Type:  General    Note:  Disposition: Outpatient   Postop Pain Control: Uneventful            Sign Out: Well controlled pain   PONV: No   Neuro/Psych: Uneventful            Sign Out: Acceptable/Baseline neuro status   Airway/Respiratory: Uneventful            Sign Out: Acceptable/Baseline resp. status   CV/Hemodynamics: Uneventful            Sign Out: Acceptable CV status; No obvious hypovolemia; No obvious fluid overload   Other NRE: NONE   DID A NON-ROUTINE EVENT OCCUR? No           Last vitals:  Vitals Value Taken Time   /61 09/12/23 1515   Temp 36.8  C (98.2  F) 09/12/23 1515   Pulse 65 09/12/23 1515   Resp 16 09/12/23 1515   SpO2 95 % 09/12/23 1516   Vitals shown include unvalidated device data.    Electronically Signed By: REE Nation CRNA  September 12, 2023  3:43 PM

## 2023-09-12 NOTE — ANESTHESIA CARE TRANSFER NOTE
Patient: Adrianne Phan    Procedure: Procedure(s):  COLONOSCOPY, WITH POLYPECTOMY AND BIOPSY       Diagnosis: Screen for colon cancer [Z12.11]  Diagnosis Additional Information: No value filed.    Anesthesia Type:   General     Note:    Oropharynx: oropharynx clear of all foreign objects  Level of Consciousness: awake  Oxygen Supplementation: room air    Independent Airway: airway patency satisfactory and stable  Dentition: dentition unchanged  Vital Signs Stable: post-procedure vital signs reviewed and stable  Report to RN Given: handoff report given  Patient transferred to: Phase II    Handoff Report: Identifed the Patient, Identified the Reponsible Provider, Reviewed the pertinent medical history, Discussed the surgical course, Reviewed Intra-OP anesthesia mangement and issues during anesthesia, Set expectations for post-procedure period and Allowed opportunity for questions and acknowledgement of understanding      Vitals:  Vitals Value Taken Time   /61 09/12/23 1515   Temp 36.8  C (98.2  F) 09/12/23 1515   Pulse 65 09/12/23 1515   Resp 16 09/12/23 1515   SpO2 95 % 09/12/23 1516   Vitals shown include unvalidated device data.    Electronically Signed By: REE Nation CRNA  September 12, 2023  3:43 PM

## 2023-09-15 LAB
PATH REPORT.COMMENTS IMP SPEC: NORMAL
PATH REPORT.FINAL DX SPEC: NORMAL
PATH REPORT.GROSS SPEC: NORMAL
PATH REPORT.MICROSCOPIC SPEC OTHER STN: NORMAL
PATH REPORT.RELEVANT HX SPEC: NORMAL
PHOTO IMAGE: NORMAL

## 2024-08-25 ENCOUNTER — HEALTH MAINTENANCE LETTER (OUTPATIENT)
Age: 64
End: 2024-08-25

## 2025-04-01 ENCOUNTER — TELEPHONE (OUTPATIENT)
Dept: OTHER | Facility: CLINIC | Age: 65
End: 2025-04-01
Payer: COMMERCIAL

## 2025-04-01 NOTE — TELEPHONE ENCOUNTER
[facility]    Who is the name of the provider?:  MAAME GANDARA   What is the location you see this provider at/preferred location?: [place]  Person calling / Facility: Adrianne Phan  Phone number:  [phone] (home)   Nurse call back needed:  ?     Reason for call:    Spoke with patient.  She was due to come back in  and did not make an appointment.    Please review and advise what needs to get scheduled.  It appears there are some  orders.    Pharmacy location:     [facility] [address] [address] [facility] [address] [facility] - [facility] [address] [facility]  Outside Imaging: n/a   Can we leave a detailed message on this number?  YES     2025, 9:22 AM

## 2025-04-01 NOTE — TELEPHONE ENCOUNTER
LOV 8/10/23 with Dr Wells    Routing to scheduling to coordinate the following:    CTA head neck  In person follow up 1 week after CTA  Please schedule this next available     Appt note: Follow up to 8/10/23    Loren LU, RN    [facility]  Office: [phone]  Fax: [phone]

## 2025-04-02 ENCOUNTER — LAB (OUTPATIENT)
Dept: LAB | Facility: CLINIC | Age: 65
End: 2025-04-02
Payer: COMMERCIAL

## 2025-04-02 DIAGNOSIS — R79.89 ELEVATED SERUM CREATININE: ICD-10-CM

## 2025-04-02 LAB
ALBUMIN UR-MCNC: NEGATIVE MG/DL
ANION GAP SERPL CALCULATED.3IONS-SCNC: 11 MMOL/L (ref 7–15)
APPEARANCE UR: CLEAR
BILIRUB UR QL STRIP: NEGATIVE
BUN SERPL-MCNC: 14.9 MG/DL (ref 8–23)
CALCIUM SERPL-MCNC: 10.1 MG/DL (ref 8.8–10.4)
CHLORIDE SERPL-SCNC: 101 MMOL/L (ref 98–107)
COLOR UR AUTO: YELLOW
CREAT SERPL-MCNC: 0.92 MG/DL (ref 0.51–0.95)
CREAT UR-MCNC: 62.2 MG/DL
EGFRCR SERPLBLD CKD-EPI 2021: 70 ML/MIN/1.73M2
GLUCOSE SERPL-MCNC: 88 MG/DL (ref 70–99)
GLUCOSE UR STRIP-MCNC: NEGATIVE MG/DL
HCO3 SERPL-SCNC: 28 MMOL/L (ref 22–29)
HGB UR QL STRIP: ABNORMAL
KETONES UR STRIP-MCNC: NEGATIVE MG/DL
LEUKOCYTE ESTERASE UR QL STRIP: ABNORMAL
MICROALBUMIN UR-MCNC: <12 MG/L
MICROALBUMIN/CREAT UR: NORMAL MG/G{CREAT}
NITRATE UR QL: NEGATIVE
PH UR STRIP: 7.5 [PH] (ref 5–7)
POTASSIUM SERPL-SCNC: 3.8 MMOL/L (ref 3.4–5.3)
RBC #/AREA URNS AUTO: ABNORMAL /HPF
SODIUM SERPL-SCNC: 140 MMOL/L (ref 135–145)
SP GR UR STRIP: 1.01 (ref 1–1.03)
SQUAMOUS #/AREA URNS AUTO: ABNORMAL /LPF
UROBILINOGEN UR STRIP-ACNC: 0.2 E.U./DL
WBC #/AREA URNS AUTO: ABNORMAL /HPF

## 2025-04-02 PROCEDURE — 82043 UR ALBUMIN QUANTITATIVE: CPT

## 2025-04-02 PROCEDURE — 80048 BASIC METABOLIC PNL TOTAL CA: CPT

## 2025-04-02 PROCEDURE — 81001 URINALYSIS AUTO W/SCOPE: CPT

## 2025-04-02 PROCEDURE — 36415 COLL VENOUS BLD VENIPUNCTURE: CPT

## 2025-04-02 PROCEDURE — 82570 ASSAY OF URINE CREATININE: CPT

## 2025-04-24 ENCOUNTER — HOSPITAL ENCOUNTER (OUTPATIENT)
Dept: CT IMAGING | Facility: CLINIC | Age: 65
Discharge: HOME | End: 2025-04-24
Attending: INTERNAL MEDICINE
Payer: COMMERCIAL

## 2025-04-24 DIAGNOSIS — I77.3 FIBROMUSCULAR DYSPLASIA: ICD-10-CM

## 2025-04-24 PROCEDURE — 250N000009 HC RX 250: Performed by: INTERNAL MEDICINE

## 2025-04-24 PROCEDURE — 250N000011 HC RX IP 250 OP 636: Performed by: INTERNAL MEDICINE

## 2025-04-24 PROCEDURE — 70496 CT ANGIOGRAPHY HEAD: CPT

## 2025-04-24 RX ORDER — IOPAMIDOL 755 MG/ML
67 INJECTION, SOLUTION INTRAVASCULAR ONCE
Status: COMPLETED | OUTPATIENT
Start: 2025-04-24 | End: 2025-04-24

## 2025-04-24 RX ADMIN — SODIUM CHLORIDE 100 ML: 9 INJECTION, SOLUTION INTRAVENOUS at 16:11

## 2025-04-24 RX ADMIN — IOPAMIDOL 67 ML: 755 INJECTION, SOLUTION INTRAVENOUS at 16:11

## 2025-05-06 ENCOUNTER — OFFICE VISIT (OUTPATIENT)
Dept: OTHER | Facility: CLINIC | Age: 65
End: 2025-05-06
Attending: INTERNAL MEDICINE
Payer: COMMERCIAL

## 2025-05-06 VITALS
WEIGHT: 167 LBS | SYSTOLIC BLOOD PRESSURE: 131 MMHG | OXYGEN SATURATION: 98 % | DIASTOLIC BLOOD PRESSURE: 81 MMHG | HEART RATE: 74 BPM | BODY MASS INDEX: 25.96 KG/M2

## 2025-05-06 DIAGNOSIS — I77.3 FIBROMUSCULAR DYSPLASIA: Primary | ICD-10-CM

## 2025-05-06 DIAGNOSIS — E78.5 HYPERLIPIDEMIA LDL GOAL <70: ICD-10-CM

## 2025-05-06 PROCEDURE — 99214 OFFICE O/P EST MOD 30 MIN: CPT | Performed by: INTERNAL MEDICINE

## 2025-05-06 PROCEDURE — G2211 COMPLEX E/M VISIT ADD ON: HCPCS | Performed by: INTERNAL MEDICINE

## 2025-05-06 PROCEDURE — 99213 OFFICE O/P EST LOW 20 MIN: CPT | Performed by: INTERNAL MEDICINE

## 2025-05-06 NOTE — PROGRESS NOTES
[facility]        Patient is here for a follow up.    Pt is currently taking no meds that would impact our treatment plan.    /81 (BP Location: Right arm, Patient Position: Sitting, Cuff Size: Adult Regular)   Pulse 74   Wt 167 lb (75.8 kg)   SpO2 98%   BMI 25.96 kg/m      The provider has been notified that the patient has no concerns.     Questions patient would like addressed today are: N/A.    Refills are needed: N/A    Has homecare services and agency name:  Casie Camargo MA

## 2025-05-06 NOTE — PATIENT INSTRUCTIONS
Recent CT unchanged FMD of carotid arteries     Labs looks good     Take baby aspirin, avoid chiropractic neck manipulations deep neck massages     Repeat head and neck CTA and CTA chest  abdomen pelvis in 2 years then office visit    Continue lifestyle modifications as you are

## 2025-05-06 NOTE — PROGRESS NOTES
[facility]  VASCULAR MEDICINE FOLLOW UP VISIT      PRIMARY HEALTH CARE PROVIDER:  Ama Palmer DO      REASON FOR VISIT:  Follow up visit   Review of  recent CTA of head and neck   Hx of of fibromuscular dysplasia of carotid arteries  She is normotensive  HLP  She made good lifestyle modifications and lipids are significantly improved  She is taking baby aspirin 2 times a week    HPI: Adrianne Phan is a 64 year old very pleasant female non-smoker, nondiabetic and normotensive underwent head and neck CTA in 2021 as a part of the work-up for short-term memory loss and headache and confusion.   This revealed FMD features of bilateral carotid arteries.  She has no history of chiropractic neck manipulations or deep neck massage etc. no history of motor vehicle accident.  She has no family history of FMD or aortopathy  She is normotensive.  In 2019 she underwent CT abdomen pelvis for abdominal pain work-up did not show any FMD features in renal arteries or mesenteric arteries.  She has hyperlipidemia and not on any medications and she is postmenopausal working on the diet.      PAST MEDICAL HISTORY  Past Medical History:   Diagnosis Date    ASCUS favor benign 05/01/2015    Neg HPV - Plan cotest in 3 years    Fibromuscular dysplasia of both carotid arteries     PAP SMEAR OF CERVIX W ASCUS 04/13/2006 4/13/2006 Ascus x 2.  Hpv- x 2.     PONV (postoperative nausea and vomiting)        CURRENT MEDICATIONS  Current Outpatient Medications   Medication Sig Dispense Refill    NO ACTIVE MEDICATIONS        No current facility-administered medications for this visit.       PAST SURGICAL HISTORY:  Past Surgical History:   Procedure Laterality Date    COLONOSCOPY  10/26/2012    Procedure: COLONOSCOPY;  colonoscopy;  Surgeon: Nai Bonilla MD;  Location: [facility]    COLONOSCOPY N/A 09/12/2023    Procedure: COLONOSCOPY, WITH POLYPECTOMY AND BIOPSY;  Surgeon: Roel Johnson MD;   Location: [facility]    GYN SURGERY  Tubal Ligation    2000    LAPAROSCOPIC APPENDECTOMY N/A 06/04/2019    Procedure: APPENDECTOMY, LAPAROSCOPIC;  Surgeon: Arthur Blair MD;  Location: [facility]    SURGICAL HISTORY OF -   [id number]    bilateral tubal ligation       ALLERGIES     Allergies   Allergen Reactions    Nkda [No Known Drug Allergy]        FAMILY HISTORY  Family History   Problem Relation Age of Onset    Thyroid Disease Mother     Hypertension Mother     Musculoskeletal Disorder Mother     Hypertension Father     Kidney Disease Father     Musculoskeletal Disorder Sister     Eye Disorder Brother     Neurologic Disorder Brother     Musculoskeletal Disorder Brother     Colon Cancer Maternal Grandmother     Mental Illness Daughter         ADHD    Colon Cancer Maternal Aunt 70    Coronary Artery Disease Other         Mother's sister    Family History Negative No family hx of        VASCULAR FAMILY HISTORY  1st order relative with atherosclerotic PAD: No  1st order relative with AAA: No  Family history of Familial Hyperlipidemia No  Family History of Hypercoagulable state:No    VASCULAR RISK FACTORS  1. Diabetes:No   2. Smoking: has never smoked.  3. HTN: normotensive  4.Hyperlipidemia: Yes - uncontrolled      SOCIAL HISTORY  Social History     Socioeconomic History    Marital status:      Spouse name: Not on file    Number of children: 2    Years of education: Not on file    Highest education level: Not on file   Occupational History    Not on file   Tobacco Use    Smoking status: Never     Passive exposure: Never    Smokeless tobacco: Never   Vaping Use    Vaping status: Never Used   Substance and Sexual Activity    Alcohol use: No    Drug use: No    Sexual activity: Yes     Partners: Male     Birth control/protection: Post-menopausal, Female Surgical   Other Topics Concern    Parent/sibling w/ CABG, MI or angioplasty before 65F 55M? 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     2 grown children     Retiring      Social Drivers of Health     Financial Resource Strain: Low Risk  (3/13/2025)    Financial Resource Strain     Within the past 12 months, have you or your family members you live with been unable to get utilities (heat, electricity) when it was really needed?: No   Food Insecurity: Low Risk  (3/13/2025)    Food Insecurity     Within the past 12 months, did you worry that your food would run out before you got money to buy more?: No     Within the past 12 months, did the food you bought just not last and you didn t have money to get more?: No   Transportation Needs: Low Risk  (3/13/2025)    Transportation Needs     Within the past 12 months, has lack of transportation kept you from medical appointments, getting your medicines, non-medical meetings or appointments, work, or from getting things that you need?: No   Physical Activity: Sufficiently Active (3/13/2025)    Exercise Vital Sign     Days of Exercise per Week: 3 days     Minutes of Exercise per Session: 50 min   Stress: No Stress Concern Present (3/13/2025)    [employer] [place] [employer] - Occupational Stress Questionnaire     Feeling of Stress : Not at all   Social Connections: Unknown (3/13/2025)    Social Connection and Isolation Panel [NHANES]     Frequency of Communication with Friends and Family: Not on file     Frequency of Social Gatherings with Friends and Family: More than three times a week     Attends Caodaism Services: Not on file     Active Member of Clubs or Organizations: Not on file     Attends Club or Organization Meetings: Not on file     Marital Status: Not on file   Interpersonal Safety: Low Risk  (3/14/2025)    Interpersonal Safety     Do you feel physically and emotionally safe where you currently live?: Yes     Within the past 12 months, have you been hit, slapped, kicked or otherwise physically hurt by someone?: No     Within the past 12 months, have you been humiliated or  emotionally abused in other ways by your partner or ex-partner?: No   Housing Stability: Low Risk  (3/13/2025)    Housing Stability     Do you have housing? : Yes     Are you worried about losing your housing?: No       ROS:   General: No change in weight, sleep or appetite.  Normal energy.  No fever or chills  Eyes: Negative for vision changes or eye problems  ENT: No problems with ears, nose or throat.  No difficulty swallowing.  Resp: No coughing, wheezing or shortness of breath  CV: No chest pains or palpitations  GI: No nausea, vomiting,  heartburn, abdominal pain, diarrhea, constipation or change in bowel habits  : No urinary frequency or dysuria, bladder or kidney problems  Musculoskeletal: No significant muscle or joint pains  Neurologic: No headaches, numbness, tingling, weakness, problems with balance or coordination  Psychiatric: No problems with anxiety, depression or mental health  Heme/immune/allergy: No history of bleeding or clotting problems or anemia.  No allergies or immune system problems  Endocrine: No history of thyroid disease, diabetes or other endocrine disorders  Skin: No rashes,worrisome lesions or skin problems  Vascular:  No claudication, lifestyle limiting or otherwise; no ischemic rest pain; no non-healing ulcers. No weakness, No loss of sensation     EXAM:  /81 (BP Location: Right arm, Patient Position: Sitting, Cuff Size: Adult Regular)   Pulse 74   Wt 167 lb (75.8 kg)   SpO2 98%   BMI 25.96 kg/m    In general, the patient is a pleasant female in no apparent distress.    HEENT: NC/AT.  PERRLA.  EOMI.  Sclerae white, not injected.  Nares clear.  Pharynx without erythema or exudate.  Dentition intact.    Neck: No adenopathy.  No thyromegaly. Carotids +2/2 bilaterally without bruits.  No jugular venous distension.   Heart: RRR. Normal S1, S2 splits physiologically. No murmur, rub, click, or gallop. The PMI is in the 5th ICS in the midclavicular line. There is no heave.     Lungs: CTA.  No ronchi, wheezes, rales.  No dullness to percussion.   Abdomen: Soft, nontender, nondistended. No organomegaly. No AAA.  No bruits.   Extremities: Vascular;   no clubbing, cyanosis, or edema.  No wounds.  Good palpable peripheral pulses in both upper and lower extremities    Labs:  LIPID RESULTS:  Lab Results   Component Value Date    CHOL 194 03/14/2025    CHOL 149 01/30/2003    HDL 65 03/14/2025    HDL 56 01/30/2003     (H) 03/14/2025    LDL 82 01/30/2003    TRIG 46 03/14/2025    TRIG 57 01/30/2003    CHOLHDLRATIO 2.67 01/30/2003       LIVER ENZYME RESULTS:  Lab Results   Component Value Date    AST 24 06/04/2019    ALT 28 06/04/2019       CBC RESULTS:  Lab Results   Component Value Date    WBC 4.5 06/23/2023    WBC 10.0 06/04/2019    RBC 4.56 06/23/2023    RBC 4.76 06/04/2019    HGB 13.4 06/23/2023    HGB 13.9 06/04/2019    HCT 42.1 06/23/2023    HCT 44.0 06/04/2019    MCV 92 06/23/2023    MCV 92 06/04/2019    MCH 29.4 06/23/2023    MCH 29.2 06/04/2019    MCHC 31.8 06/23/2023    MCHC 31.6 06/04/2019    RDW 12.9 06/23/2023    RDW 12.2 06/04/2019     06/23/2023     06/04/2019       BMP RESULTS:  Lab Results   Component Value Date     04/02/2025     06/04/2019    POTASSIUM 3.8 04/02/2025    POTASSIUM 3.6 10/02/2021    POTASSIUM 3.6 06/04/2019    CHLORIDE 101 04/02/2025    CHLORIDE 110 (H) 10/02/2021    CHLORIDE 107 06/04/2019    CO2 28 04/02/2025    CO2 25 10/02/2021    CO2 27 06/04/2019    ANIONGAP 11 04/02/2025    ANIONGAP 6 10/02/2021    ANIONGAP 8 06/04/2019    GLC 88 04/02/2025     (H) 10/02/2021     (H) 10/02/2021     (H) 06/04/2019    BUN 14.9 04/02/2025    BUN 17 10/02/2021    BUN 13 06/04/2019    CR 0.92 04/02/2025    CR 0.81 06/04/2019    GFRESTIMATED 70 04/02/2025    GFRESTIMATED 80 06/04/2019    GFRESTBLACK >90 06/04/2019    KASSIDY 10.1 04/02/2025    KASSIDY 9.6 06/04/2019          THYROID RESULTS:  Lab Results   Component Value Date    TSH  2.03 06/23/2023       Procedures:   CTA  HEAD NECK WITH CONTRAST 10/2/2021 12:38 PM      HISTORY: Code Stroke to evaluate for potential thrombolysis and  thrombectomy. Acute onset of memory loss.     TECHNIQUE: Axial images were obtained through the head and neck with  intravenous contrast. 70 mL of gadolinium given. Multiplanar  reconstructions were performed. 3-D reconstructions of remote  workstation for CT angiography were also acquired. Radiation dose for  this scan was reduced using automated exposure control, adjustment of  the mA and/or kV according to patient size, or iterative  reconstruction technique.. Carotid stenosis were evaluated by  comparing the caliber of the proximal internal carotid artery to the  caliber of the distal internal carotid artery.     COMPARISON: None.     FINDINGS:     Brachiocephalic vessels: Normal.     Right carotid system: Carotid bifurcations are widely patent. There is  some mild narrowing of the right internal carotid artery with some  beading. FINDINGS are consistent with some fibromuscular dysplasia.  There is no evidence for any acute dissection.     Left carotid system: There is some evidence of some fibromuscular  dysplasia in the cervical internal carotid artery. Carotid bifurcation  is widely patent. There is no evidence for dissection.     Right vertebral artery: Normal nondominant vessel.     Left vertebral artery: Normal.     [place]: Normal. 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 This is an anatomic variant.     Other findings: None.                                                                      IMPRESSION:  1. 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  2. No evidence for significant stenosis, dissection, thromboembolism,  or aneurysm.     JENNIFER OSHEA MD      EXAM: CTA HEAD NECK W CONTRAST  LOCATION: [facility]  DATE: 8/1/2023     INDICATION: Headache; Neurologic deficit, non traumatic; Altered mental status; Neurologic deficit onset <= 24 hours; No known automatically detected potential contraindications to iodinated contrast  COMPARISON: None.  CONTRAST: 75 mL Isovue 370  TECHNIQUE: Head and neck CT angiogram with IV contrast. Axial helical CT images of the head and neck vessels obtained during the arterial phase of intravenous contrast administration. Axial 2D reconstructed images and multiplanar 3D MIP reconstructed   images of the head and neck vessels were performed by the technologist. Dose reduction techniques were used. All stenosis measurements made according to NASCET criteria unless otherwise specified.     FINDINGS:      HEAD CTA:  ANTERIOR CIRCULATION: No stenosis/occlusion or high flow vascular malformation. Hypoplastic right A1 segment, anatomic variant. Tiny conically-shaped inferiorly directed outpouching arising from the expected location of the right posterior communicating   artery origin has a tiny vessel emanating from its tip and is most consistent with an infundibulum (series 9/images 29-30).     POSTERIOR CIRCULATION: No stenosis/occlusion, aneurysm, or high flow vascular malformation.       DURAL VENOUS SINUSES: Expected enhancement of the major dural venous sinuses.     NECK CTA:  RIGHT CAROTID: No measurable stenosis or dissection. Undulating contour irregularity along the length of the internal carotid artery.     LEFT CAROTID: No measurable stenosis or dissection. Undulating contour irregularity along the length of the internal carotid artery.     VERTEBRAL ARTERIES: No focal stenosis or dissection. Left dominant. A few areas of mild luminal narrowing/irregularity involving the left vertebral artery V2 segment secondary to extrinsic compression from cervical spine degenerative changes. Nondominant   right vertebral artery is diminutive in caliber along its entire length.     AORTIC  ARCH: Classic aortic arch anatomy with no significant stenosis at the origin of the great vessels.     NONVASCULAR STRUCTURES: Mucus retention cyst in the right maxillary sinus. Multilevel cervical spondylosis without high-grade spinal canal stenosis.                                                                      IMPRESSION:      HEAD CTA:   1.  No high-grade stenosis or occlusion involving the major intracranial arteries.  2.  Tiny conically-shaped inferiorly directed outpouching arising from the expected location of the right posterior communicating artery origin has a tiny vessel emanating from its tip and is most consistent with an infundibulum.     NECK CTA:  1.  No high-grade stenosis or occlusion involving the major arteries of the neck.  2.  Abnormal undulating contour irregularity of the bilateral cervical internal carotid arteries, favored to represent fibromuscular dysplasia.  3.  No evidence of dissection.    EXAM: CTA HEAD NECK W CONTRAST  LOCATION: [facility]  DATE: 4/24/2025     INDICATION: Fibromuscular dysplasia.  COMPARISON: CTA of the head and neck 8/1/2023.  CONTRAST: 67 mL Isovue 370  TECHNIQUE: Axial helical CT images of the head and neck vessels obtained during the arterial phase of intravenous contrast administration. Axial 2D reconstructed images and multiplanar 3D MIP reconstructed images of the head and neck vessels were   performed by the technologist. Dose reduction techniques were used. All stenosis measurements made according to NASCET criteria unless otherwise specified.     FINDINGS:      HEAD CTA:  No evidence of large vessel occlusion, high-grade stenosis, aneurysm, or high flow vascular malformation. Incidental bilateral posterior communicating infundibula, unchanged.     NECK CTA:  Stable pattern of corrugated irregularities involving the bilateral internal carotid arteries, consistent with fibromuscular dysplasia. No evidence of dissection. No  evidence of large vessel occlusion or high-grade stenosis. No significant stenoses at   the carotid bifurcations. Left vertebral artery is dominant. A two-vessel aortic arch is present with common origin of the brachiocephalic and left common carotid arteries.     OTHER: Presumed atelectasis at the lung apices. Cervical spine degenerative changes.                                                                      IMPRESSION:   Stable pattern of corrugated irregularities involving the bilateral internal carotid arteries, consistent with fibromuscular dysplasia      Assessment and Plan:     1. Fibromuscular dysplasia (H) carotid arteries CTA 10/2021     2. Hyperlipidemia LDL goal <70    This is a very pleasant 64-year-old female postmenopausal normotensive, nondiabetic, non-smoker and no past medical history developed confusion, short-term memory loss seen in the ER evaluated by neurology underwent brain MRI and also CT of head and neck in 2021 which revealed bilateral carotid FMD features.  She is totally asymptomatic.   She also underwent CT scan of the abdomen pelvis in 2019 which I personally reviewed no FMD features in the renal or mesenteric vessels.  She denies any chest pain, shortness of breath or palpitations  LDL and triglycerides significantly improved  She underwent CTA of head and neck last week FMD changes in carotids stable     Reviewed all the imaging studies with the patient today     At present my recommendations,    Congratulated for lifestyle modifications with improvement of lipids continue the same  Suggested taking baby aspirin every other day and take with food    Avoid chiropractic neck manipulations, sudden jerky neck movements, deep neck massage etc    Educated on FMD and its long-term implications    CTA of head and neck, CTA of chest, abdomen pelvis in 2 years then visit    30 minutes spent on the date of the encounter doing chart review, review of recent imaging studies, laboratory  test, history, exam, documentation and addressed above-mentioned issues.  AVS with written instructions given.  Answered all her questions    The longitudinal care of plan for the above diagnoses was addressed during this visit. Due to added complexity of care, we will continue to supprt Adrianne Phan and the subsequent management of this/these conditions and with ongoing continuity of care for this/these conditions.     Copy of this note to primary care physician      Marlene Wells MD, FAHA, FSVM, FNLA, FSVM  Vascular Medicine  Clinical Hypertension specialist  Clinical Lipidologist

## (undated) DEVICE — ENDO FORCEP ENDOJAW BIOPSY 2.8MMX230CM FB-220U

## (undated) DEVICE — ENDO SNARE EXACTO COLD 9MM LOOP 2.4MMX230CM 00711115

## (undated) DEVICE — SU VICRYL 0 UR-6 27" J603H

## (undated) DEVICE — ENDO TROCAR BLUNT TIP KII BALLOON 12X100MM C0R47

## (undated) DEVICE — DRAPE POUCH INSTRUMENT 3 POCKET 1018L

## (undated) DEVICE — ENDO POUCH UNIV RETRIEVAL SYSTEM INZII 10MM CD001

## (undated) DEVICE — ESU HOLSTER PLASTIC DISP E2400

## (undated) DEVICE — SU VICRYL 4-0 FS-2 27" J422-H

## (undated) DEVICE — SOL NACL 0.9% IRRIG 1000ML BOTTLE 07138-09

## (undated) DEVICE — SU ENDO POLYSORB 0 3" LOOP 21" EL-21-L

## (undated) DEVICE — DECANTER VIAL 2006S

## (undated) DEVICE — ENDO TROCAR SLEEVE KII ADV FIXATION 05X100MM CFS02

## (undated) DEVICE — Device

## (undated) DEVICE — GLOVE PROTEXIS W/NEU-THERA 7.5  2D73TE75

## (undated) DEVICE — SUCTION IRR STRYKERFLOW II W/TIP 250-070-520

## (undated) DEVICE — GOWN XLG DISP 9545

## (undated) DEVICE — STOCKING SLEEVE COMPRESSION CALF MED

## (undated) DEVICE — PREP CHLORAPREP 26ML TINTED ORANGE  260815

## (undated) DEVICE — ENDO TROCAR FIRST ENTRY KII FIOS ADV FIX 05X100MM CFF03

## (undated) DEVICE — SOL NACL 0.9% IRRIG 3000ML BAG 07972-08

## (undated) DEVICE — ESU LIGASURE LAPAROSCOPIC BLUNT TIP SEALER 5MMX37CM LF1837

## (undated) DEVICE — ADH SKIN CLOSURE PREMIERPRO EXOFIN 1.0ML 3470

## (undated) RX ORDER — ONDANSETRON 2 MG/ML
INJECTION INTRAMUSCULAR; INTRAVENOUS
Status: DISPENSED
Start: 2019-06-04

## (undated) RX ORDER — ACETAMINOPHEN 325 MG/1
TABLET ORAL
Status: DISPENSED
Start: 2019-06-04

## (undated) RX ORDER — FENTANYL CITRATE 50 UG/ML
INJECTION, SOLUTION INTRAMUSCULAR; INTRAVENOUS
Status: DISPENSED
Start: 2019-06-04

## (undated) RX ORDER — PROPOFOL 10 MG/ML
INJECTION, EMULSION INTRAVENOUS
Status: DISPENSED
Start: 2019-06-04

## (undated) RX ORDER — HYDROCODONE BITARTRATE AND ACETAMINOPHEN 5; 325 MG/1; MG/1
TABLET ORAL
Status: DISPENSED
Start: 2019-06-04

## (undated) RX ORDER — KETOROLAC TROMETHAMINE 30 MG/ML
INJECTION, SOLUTION INTRAMUSCULAR; INTRAVENOUS
Status: DISPENSED
Start: 2019-06-04

## (undated) RX ORDER — PROPOFOL 10 MG/ML
INJECTION, EMULSION INTRAVENOUS
Status: DISPENSED
Start: 2023-09-12

## (undated) RX ORDER — BUPIVACAINE HYDROCHLORIDE AND EPINEPHRINE 5; 5 MG/ML; UG/ML
INJECTION, SOLUTION EPIDURAL; INTRACAUDAL; PERINEURAL
Status: DISPENSED
Start: 2019-06-04

## (undated) RX ORDER — LIDOCAINE HYDROCHLORIDE 10 MG/ML
INJECTION, SOLUTION EPIDURAL; INFILTRATION; INTRACAUDAL; PERINEURAL
Status: DISPENSED
Start: 2023-09-12

## (undated) RX ORDER — ONDANSETRON 2 MG/ML
INJECTION INTRAMUSCULAR; INTRAVENOUS
Status: DISPENSED
Start: 2023-09-12

## (undated) RX ORDER — DEXAMETHASONE SODIUM PHOSPHATE 4 MG/ML
INJECTION, SOLUTION INTRA-ARTICULAR; INTRALESIONAL; INTRAMUSCULAR; INTRAVENOUS; SOFT TISSUE
Status: DISPENSED
Start: 2019-06-04

## (undated) RX ORDER — NEOSTIGMINE METHYLSULFATE 1 MG/ML
VIAL (ML) INJECTION
Status: DISPENSED
Start: 2019-06-04

## (undated) RX ORDER — MEPERIDINE HYDROCHLORIDE 25 MG/ML
INJECTION INTRAMUSCULAR; INTRAVENOUS; SUBCUTANEOUS
Status: DISPENSED
Start: 2019-06-04

## (undated) RX ORDER — GLYCOPYRROLATE 0.2 MG/ML
INJECTION, SOLUTION INTRAMUSCULAR; INTRAVENOUS
Status: DISPENSED
Start: 2019-06-04

## (undated) RX ORDER — LIDOCAINE HYDROCHLORIDE 10 MG/ML
INJECTION, SOLUTION EPIDURAL; INFILTRATION; INTRACAUDAL; PERINEURAL
Status: DISPENSED
Start: 2019-06-04